# Patient Record
Sex: MALE | Race: WHITE | NOT HISPANIC OR LATINO
[De-identification: names, ages, dates, MRNs, and addresses within clinical notes are randomized per-mention and may not be internally consistent; named-entity substitution may affect disease eponyms.]

---

## 2021-05-26 PROBLEM — Z00.00 ENCOUNTER FOR PREVENTIVE HEALTH EXAMINATION: Status: ACTIVE | Noted: 2021-05-26

## 2021-06-28 ENCOUNTER — APPOINTMENT (OUTPATIENT)
Dept: ORTHOPEDIC SURGERY | Facility: CLINIC | Age: 76
End: 2021-06-28
Payer: COMMERCIAL

## 2021-06-28 DIAGNOSIS — M25.562 PAIN IN LEFT KNEE: ICD-10-CM

## 2021-06-28 DIAGNOSIS — Z78.9 OTHER SPECIFIED HEALTH STATUS: ICD-10-CM

## 2021-06-28 DIAGNOSIS — Z86.79 PERSONAL HISTORY OF OTHER DISEASES OF THE CIRCULATORY SYSTEM: ICD-10-CM

## 2021-06-28 PROCEDURE — 99072 ADDL SUPL MATRL&STAF TM PHE: CPT

## 2021-06-28 PROCEDURE — 73562 X-RAY EXAM OF KNEE 3: CPT | Mod: LT

## 2021-06-28 PROCEDURE — 99203 OFFICE O/P NEW LOW 30 MIN: CPT

## 2021-06-28 RX ORDER — LEVOTHYROXINE SODIUM 25 UG/1
25 TABLET ORAL
Qty: 90 | Refills: 0 | Status: DISCONTINUED | COMMUNITY
Start: 2021-04-07

## 2021-06-28 RX ORDER — TAMSULOSIN HYDROCHLORIDE 0.4 MG/1
0.4 CAPSULE ORAL
Qty: 90 | Refills: 0 | Status: DISCONTINUED | COMMUNITY
Start: 2021-06-24

## 2021-06-29 NOTE — ASSESSMENT
[FreeTextEntry1] : 75 year old male presents to the office with complaints of left knee pain. The pain has gotten progressively worse over the years. He is currently only walking 2-3 blocks. He has had no recent Tx for the knee. He has an arthroscopy in 1995. He comes here for eval and Tx. I did explain to him he has advanced OA of the knee. While he would benefit from an elective knee replacement, he may want to try conservative management first. He agreed and opted to try the gel injections.We will put in for approval and see him in the next few weeks. \par \par **ORDER GEL INJECTIONS LEFT KNEE**

## 2021-06-29 NOTE — PHYSICAL EXAM
[de-identified] : General appearance: well nourished and hydrated, pleasant, alert and oriented x 3, cooperative.\par Cardiovascular: no apparent abnormalities, no lower leg edema, no varicosities, pedal pulses are palpable.\par Neurologic: sensation is normal, no muscle weakness in upper or lower extremities\par Dermatologic no apparent skin lesions, moist, warm, no rash.\par Gait: nonantalgic.\par \par Left knee\par Inspection: no effusion or erythema.\par Wounds: none.\par Alignment: normal.\par Palpation: medial joint line tenderness \par ROM: 0-100 DEGREES\par Ligamentous laxity: all ligaments appear stable\par Muscle Test: good quad strength.\par \par Right knee\par Inspection: no effusion or erythema.\par Wounds: none.\par Alignment: normal.\par Palpation: no specific tenderness on palpation.\par ROM: 0-120 DEGREES \par Ligamentous laxity: all ligaments appear stable\par Muscle Test: good quad strength.\par \par Left hip\par Inspection: No swelling or ecchymosis.\par Wounds: none.\par Palpation: non-tender.\par Stability: no instability.\par Strength: 5/5 all motor groups.\par ROM: no pain with FROM.\par Leg length: equal.\par \par Right hip\par Inspection: No swelling or ecchymosis.\par Wounds: none.\par Palpation: non-tender.\par Stability: no instability.\par Strength: 5/5 all motor groups.\par ROM: no pain with FROM.\par Leg length: equal.\par  [de-identified] : Radiographs done today AP lateral and skyline of the left knee shows significantly narrowed but maintained medial joint space on the left, moderate on the right

## 2021-06-29 NOTE — HISTORY OF PRESENT ILLNESS
[de-identified] : 75 year old male presents to the office today complaining of left knee pain that has worsened in the last 2 years. PAtient reports pain that is achy in nature along with a sensation  of stiffness. There is buckling, clicking, and a loss of motion. PAtient reports only being able to ambulate about 2-3 blocks before pain stops her. There is pain and difficulty with negotiating stairs. PAtient also reports pain with prolonged standing. He reports taking Ibuprofen occasionally with good relief. PAtient states he had an arthroscopy of the left knee in 1995. PAtient is here today to discuss his next options for pain relief.

## 2021-07-21 ENCOUNTER — EMERGENCY (EMERGENCY)
Facility: HOSPITAL | Age: 76
LOS: 0 days | Discharge: HOME | End: 2021-07-21
Attending: EMERGENCY MEDICINE | Admitting: EMERGENCY MEDICINE
Payer: COMMERCIAL

## 2021-07-21 VITALS
TEMPERATURE: 98 F | RESPIRATION RATE: 18 BRPM | HEART RATE: 71 BPM | OXYGEN SATURATION: 95 % | SYSTOLIC BLOOD PRESSURE: 175 MMHG | WEIGHT: 173.94 LBS | DIASTOLIC BLOOD PRESSURE: 82 MMHG

## 2021-07-21 DIAGNOSIS — R10.9 UNSPECIFIED ABDOMINAL PAIN: ICD-10-CM

## 2021-07-21 DIAGNOSIS — R14.3 FLATULENCE: ICD-10-CM

## 2021-07-21 DIAGNOSIS — E03.9 HYPOTHYROIDISM, UNSPECIFIED: ICD-10-CM

## 2021-07-21 DIAGNOSIS — K59.00 CONSTIPATION, UNSPECIFIED: ICD-10-CM

## 2021-07-21 DIAGNOSIS — Z87.438 PERSONAL HISTORY OF OTHER DISEASES OF MALE GENITAL ORGANS: ICD-10-CM

## 2021-07-21 DIAGNOSIS — Z87.891 PERSONAL HISTORY OF NICOTINE DEPENDENCE: ICD-10-CM

## 2021-07-21 DIAGNOSIS — Z95.1 PRESENCE OF AORTOCORONARY BYPASS GRAFT: ICD-10-CM

## 2021-07-21 DIAGNOSIS — I25.10 ATHEROSCLEROTIC HEART DISEASE OF NATIVE CORONARY ARTERY WITHOUT ANGINA PECTORIS: ICD-10-CM

## 2021-07-21 DIAGNOSIS — E78.00 PURE HYPERCHOLESTEROLEMIA, UNSPECIFIED: ICD-10-CM

## 2021-07-21 PROCEDURE — 99284 EMERGENCY DEPT VISIT MOD MDM: CPT

## 2021-07-21 PROCEDURE — 76000 FLUOROSCOPY <1 HR PHYS/QHP: CPT | Mod: 26

## 2021-07-21 RX ORDER — MULTIVIT WITH MIN/MFOLATE/K2 340-15/3 G
1 POWDER (GRAM) ORAL ONCE
Refills: 0 | Status: DISCONTINUED | OUTPATIENT
Start: 2021-07-21 | End: 2021-07-21

## 2021-07-21 NOTE — ED PROVIDER NOTE - NS ED ROS FT
CONST: No fever, chills or bodyaches  CARD: No chest pain, palpitations  RESP: No SOB, cough, hemoptysis.   MS: No joint pain, back pain or extremity pain/injury  SKIN: No rashes  NEURO: No headache, dizziness, paresthesias or LOC

## 2021-07-21 NOTE — ED PROVIDER NOTE - OBJECTIVE STATEMENT
Pt with hx of CAD presents with 6 days of constipation that began after eating a whole container of cheese. Pt had left sided abd pain that resolved as of yesterday. Taking dulcolax with only liquid stools and some small formed pieces but not what he says is his normal. No longer with pain. Denies fever, NV. Admits to lots of flatus. Denies rectal pain

## 2021-07-21 NOTE — ED PROVIDER NOTE - PHYSICAL EXAMINATION
CONST: Well appearing in NAD  NECK: Non-tender, no meningeal signs  CARD: Normal S1 S2; Normal rate and rhythm  RESP: Equal BS B/L, No wheezes, rhonchi or rales. No distress  GI: Soft, non-tender, non-distended.  MS: Normal ROM in all extremities. No midline spinal tenderness.  SKIN: Warm, dry, no acute rashes. Good turgor  NEURO: A&Ox3, No focal deficits. Strength 5/5 with no sensory deficits. Steady gait  rectal: No stool in vault

## 2021-07-21 NOTE — ED PROVIDER NOTE - CARE PROVIDER_API CALL
Mike Barragan)  Gastroenterology; Internal Medicine  [address] [place] [address]  Phone: [phone]  Fax: [phone]  Follow Up Time: 4-6 Days

## 2021-07-21 NOTE — ED PROVIDER NOTE - CLINICAL SUMMARY MEDICAL DECISION MAKING FREE TEXT BOX
76 yo male PMH CAD s/o CABG years ago, HTN, elevated cholesterol BPH, hypothyroidism c/o constipation for 4 days.  Patient reports eating a large container of hard cheese on Friday, that was also the day of his last BM.,  In the past cheese ingestion has given him constipation .  Reports passing small amt of hard and liquidy stools 2 days ago, he also had transient abdominal pain a few days ago which has resolved since.  Denies any recent illness, unintentional weight loss, fever, chills flank or back pain, no urinary complaints , no black or bloody stools or any other concerning symptoms.   Well-appearing well-nourished elderly male in  NAD, head AT/NC, PERRL, pink conjunctivae,  mmm, nml oropharynx, nml phonation without drooling or trismus, supple neck without midline spine ttp, nml work of breathing, lungs CTA b/l, equal air entry, RRR, well-perfused extremities, distal pulses intact, abdomen soft, NT/ND, BS present in all quadrants, no midline spine or CVA ttp, no leg edema or unilateral calf swelling, A&Ox3, no focal neuro deficits, nml mood and affect, KONSTANTIN performed by MARYAM Ledezma and reported as negative for blood /masses or stool.  Abdominal x-ray shows non-obstructive bowel pattern, patient was prescribed Mag citrate , stable for d/c home, strict return precautions given.

## 2021-07-21 NOTE — ED PROVIDER NOTE - PATIENT PORTAL LINK FT
You can access the FollowMyHealth Patient Portal offered by [facility] by registering at the following website: [email or web address]. By joining [facility]’s FollowMyHealth portal, you will also be able to view your health information using other applications (apps) compatible with our system.

## 2021-12-14 PROBLEM — I10 ESSENTIAL (PRIMARY) HYPERTENSION: Chronic | Status: ACTIVE | Noted: 2021-07-21

## 2021-12-14 PROBLEM — E78.00 PURE HYPERCHOLESTEROLEMIA, UNSPECIFIED: Chronic | Status: ACTIVE | Noted: 2021-07-21

## 2022-03-07 ENCOUNTER — APPOINTMENT (OUTPATIENT)
Dept: UROLOGY | Facility: CLINIC | Age: 77
End: 2022-03-07
Payer: COMMERCIAL

## 2022-03-07 VITALS — WEIGHT: 170 LBS | BODY MASS INDEX: 25.76 KG/M2 | HEIGHT: 68 IN

## 2022-03-07 DIAGNOSIS — E07.9 DISORDER OF THYROID, UNSPECIFIED: ICD-10-CM

## 2022-03-07 PROCEDURE — 99204 OFFICE O/P NEW MOD 45 MIN: CPT

## 2022-03-07 NOTE — ASSESSMENT
[FreeTextEntry1] : 76 year old male presents for evaluation for Frequent Urination and Erectile Dysfunction. \par Currently on Tamsulosin for Urinary symptoms with mild improvement. \par \par Will obtain Kidney Bladder Sonogram\par UA and U culture. \par \par Tadalafil for erections. Side effects review. Emergency Precautions reviewed. \par \par Plan\par -Follow up 2 months to review above. \par

## 2022-03-07 NOTE — HISTORY OF PRESENT ILLNESS
[FreeTextEntry1] : STEVE BONILLA is a 76 year year old presenting for a General Urology Check Up. \par Patient has a past medical history of hypertension, and heart attack in 1993. High Cholesterol. \par \par Urination symptoms: Patient reports weak urinary stream. He states that he feels he urinates frequently. Patient reports nocturia 4 x nightly. Patient states that he has started to have urgency symptoms last month. Denies dysuria and gross hematuria. \par Patient states that his urinary symptoms when he has constipation. \par Patient was started on Tamsulosin which has helped with his urinary symptoms. \par \par Erections: Patient reports that he has difficulty getting or maintaining an erection. Patient is interested in treatment. Patient states he has tried medication in the past Levitra and he states that he got light headed when he took the medication last weekend. \par Patient denies chest pain with exercise. Denies taking nitroglycerine medication. \par \par Prostate Cancer Screening: PSA in 2015 was 1.71 ng/mL \par \par Liquid Intake: 2 cups of coffee daily. Patient states he recently stopped drinking coffee and now only drinks 1 cups of green tea. \par Occupation: . \par \par Primary Care Doctor: Dr. Dobbins. \par

## 2022-03-15 ENCOUNTER — NON-APPOINTMENT (OUTPATIENT)
Age: 77
End: 2022-03-15

## 2022-03-15 RX ORDER — TADALAFIL 20 MG/1
20 TABLET ORAL
Qty: 18 | Refills: 3 | Status: DISCONTINUED | COMMUNITY
Start: 2022-03-07 | End: 2022-03-15

## 2022-03-16 ENCOUNTER — NON-APPOINTMENT (OUTPATIENT)
Age: 77
End: 2022-03-16

## 2022-05-02 ENCOUNTER — LABORATORY RESULT (OUTPATIENT)
Age: 77
End: 2022-05-02

## 2022-05-02 ENCOUNTER — APPOINTMENT (OUTPATIENT)
Dept: UROLOGY | Facility: CLINIC | Age: 77
End: 2022-05-02
Payer: COMMERCIAL

## 2022-05-02 VITALS — BODY MASS INDEX: 25.76 KG/M2 | WEIGHT: 170 LBS | HEIGHT: 68 IN

## 2022-05-02 DIAGNOSIS — N28.1 CYST OF KIDNEY, ACQUIRED: ICD-10-CM

## 2022-05-02 PROCEDURE — 99214 OFFICE O/P EST MOD 30 MIN: CPT

## 2022-05-02 NOTE — HISTORY OF PRESENT ILLNESS
[FreeTextEntry1] : 76 year old male presents for evaluation for Frequent Urination and Erectile Dysfunction. \par Currently on Tamsulosin for Urinary symptoms with mild improvement. \par \par Tadalafil started for ED which he says give better erections\par \par labs not done\par Culture - Urine;\par Urinalysis; Status:\par \par April 2022\par US Kidney and Bladder- normal right kidney, small left renal stone without obstruction 5mm.  partially septated left renal cyst -- rec follow up imaging.  bph with mild bladder wall thickening -- prostate 51g.  postvoid 9ml

## 2022-05-04 ENCOUNTER — APPOINTMENT (OUTPATIENT)
Dept: CARDIOLOGY | Facility: CLINIC | Age: 77
End: 2022-05-04
Payer: COMMERCIAL

## 2022-05-04 VITALS
BODY MASS INDEX: 27.43 KG/M2 | SYSTOLIC BLOOD PRESSURE: 130 MMHG | HEART RATE: 65 BPM | DIASTOLIC BLOOD PRESSURE: 76 MMHG | HEIGHT: 68 IN | WEIGHT: 181 LBS

## 2022-05-04 DIAGNOSIS — E03.9 HYPOTHYROIDISM, UNSPECIFIED: ICD-10-CM

## 2022-05-04 DIAGNOSIS — Z01.810 ENCOUNTER FOR PREPROCEDURAL CARDIOVASCULAR EXAMINATION: ICD-10-CM

## 2022-05-04 DIAGNOSIS — E78.5 HYPERLIPIDEMIA, UNSPECIFIED: ICD-10-CM

## 2022-05-04 DIAGNOSIS — Z13.6 ENCOUNTER FOR SCREENING FOR CARDIOVASCULAR DISORDERS: ICD-10-CM

## 2022-05-04 PROCEDURE — 99214 OFFICE O/P EST MOD 30 MIN: CPT

## 2022-05-04 PROCEDURE — 93000 ELECTROCARDIOGRAM COMPLETE: CPT

## 2022-05-04 PROCEDURE — 99204 OFFICE O/P NEW MOD 45 MIN: CPT

## 2022-05-04 RX ORDER — FINASTERIDE 5 MG/1
5 TABLET, FILM COATED ORAL
Qty: 90 | Refills: 3 | Status: DISCONTINUED | COMMUNITY
Start: 2022-05-02 | End: 2022-05-04

## 2022-05-04 NOTE — HISTORY OF PRESENT ILLNESS
[FreeTextEntry1] : Previously seen at [facility].\par \par 75 yo M with a history of CAD s/p 1v CABG, (4/1982), MI in 8/1993 (no intervention), HTN presents for risk stratification prior to L TKR with Dr. Womack.  Occasional "heartburn" sometimes after meals.  Denies dyspnea, palpitations, dizziness, presyncope or syncope.  Limited functional capacity due to arthritis\par \par \par EKG (5/4/2022):  NSR, IWMI, no ST-T changes\par

## 2022-05-04 NOTE — PHYSICAL EXAM
[Well Developed] : well developed [No Acute Distress] : no acute distress [Normal Conjunctiva] : normal conjunctiva [No Carotid Bruit] : no carotid bruit [Normal S1, S2] : normal S1, S2 [No Murmur] : no murmur [No Rub] : no rub [Clear Lung Fields] : clear lung fields [Good Air Entry] : good air entry [No Respiratory Distress] : no respiratory distress  [Soft] : abdomen soft [Non Tender] : non-tender [No Masses/organomegaly] : no masses/organomegaly [Normal Gait] : normal gait [No Edema] : no edema [No Cyanosis] : no cyanosis [No Rash] : no rash [No Skin Lesions] : no skin lesions [Moves all extremities] : moves all extremities [Alert and Oriented] : alert and oriented

## 2022-05-04 NOTE — REVIEW OF SYSTEMS
[Negative] : Heme/Lymph [FreeTextEntry5] : See HPI [FreeTextEntry6] : See HPI [FreeTextEntry9] : +arthritis

## 2022-05-09 LAB
APPEARANCE: CLEAR
BACTERIA UR CULT: NORMAL
BILIRUBIN URINE: NEGATIVE
BLOOD URINE: NEGATIVE
COLOR: ABNORMAL
GLUCOSE QUALITATIVE U: NEGATIVE
KETONES URINE: NEGATIVE
LEUKOCYTE ESTERASE URINE: NEGATIVE
NITRITE URINE: NEGATIVE
PH URINE: 6.5
PROTEIN URINE: NORMAL
SPECIFIC GRAVITY URINE: 1.02
UROBILINOGEN URINE: NORMAL

## 2022-05-18 ENCOUNTER — APPOINTMENT (OUTPATIENT)
Dept: ORTHOPEDIC SURGERY | Facility: CLINIC | Age: 77
End: 2022-05-18
Payer: COMMERCIAL

## 2022-05-18 VITALS — TEMPERATURE: 98 F

## 2022-05-18 PROCEDURE — 99213 OFFICE O/P EST LOW 20 MIN: CPT | Mod: 57

## 2022-05-19 NOTE — ASSESSMENT
[FreeTextEntry1] : 6/28/2021- 75 year old male presents to the office with complaints of left knee pain. The pain has gotten progressively worse over the years. He is currently only walking 2-3 blocks. He has had no recent Tx for the knee. He has an arthroscopy in 1995. He comes here for eval and Tx. I did explain to him he has advanced OA of the knee. While he would benefit from an elective knee replacement, he may want to try conservative management first. He agreed and opted to try the gel injections.We will put in for approval and see him in the next few weeks. \par \par 5/18/2022- Pt presents for a follow up of left knee pain. We ordered gel injections and they were not covered. He would like to proceed with an elective knee replacement surgery. We will schedule him for September. He will return in a few months for a pre op discussion. \par \par

## 2022-05-19 NOTE — PHYSICAL EXAM
[de-identified] : \par Left knee\par Inspection: no effusion or erythema.\par Wounds: none.\par Alignment: normal.\par Palpation: medial joint line tenderness \par ROM: 0-100 DEGREES\par Ligamentous laxity: all ligaments appear stable\par Muscle Test: good quad strength. [de-identified] : 6/28/2021- Radiographs done today AP lateral and skyline of the left knee shows significantly narrowed but maintained medial joint space on the left, moderate on the right

## 2022-05-23 ENCOUNTER — OUTPATIENT (OUTPATIENT)
Dept: OUTPATIENT SERVICES | Facility: HOSPITAL | Age: 77
LOS: 1 days | Discharge: HOME | End: 2022-05-23
Payer: COMMERCIAL

## 2022-05-23 DIAGNOSIS — I25.10 ATHEROSCLEROTIC HEART DISEASE OF NATIVE CORONARY ARTERY WITHOUT ANGINA PECTORIS: ICD-10-CM

## 2022-05-23 PROCEDURE — 78452 HT MUSCLE IMAGE SPECT MULT: CPT | Mod: 26,MH

## 2022-06-13 ENCOUNTER — APPOINTMENT (OUTPATIENT)
Dept: CARDIOLOGY | Facility: CLINIC | Age: 77
End: 2022-06-13
Payer: COMMERCIAL

## 2022-06-13 PROCEDURE — 93306 TTE W/DOPPLER COMPLETE: CPT

## 2022-07-12 ENCOUNTER — APPOINTMENT (OUTPATIENT)
Dept: CARDIOLOGY | Facility: CLINIC | Age: 77
End: 2022-07-12

## 2022-07-27 ENCOUNTER — APPOINTMENT (OUTPATIENT)
Dept: ORTHOPEDIC SURGERY | Facility: CLINIC | Age: 77
End: 2022-07-27

## 2022-07-27 DIAGNOSIS — M17.12 UNILATERAL PRIMARY OSTEOARTHRITIS, LEFT KNEE: ICD-10-CM

## 2022-07-27 PROCEDURE — 99214 OFFICE O/P EST MOD 30 MIN: CPT

## 2022-07-27 RX ORDER — TADALAFIL 20 MG/1
20 TABLET ORAL
Qty: 10 | Refills: 3 | Status: DISCONTINUED | COMMUNITY
Start: 2022-03-15 | End: 2022-07-27

## 2022-07-27 NOTE — PHYSICAL EXAM
[2+] : left 2+ [de-identified] : Left knee\par Inspection: no effusion or erythema.\par Wounds: none.\par Alignment: normal.\par Palpation: medial joint line tenderness \par ROM: 5-100 DEGREES\par Ligamentous laxity: all ligaments appear stable\par Muscle Test: good quad strength. [de-identified] : 6/28/2021- Radiographs done today AP lateral and skyline of the left knee shows significantly narrowed but maintained medial joint space on the left, moderate on the right

## 2022-07-27 NOTE — ASSESSMENT
[FreeTextEntry1] : 6/28/2021- 75 year old male presents to the office with complaints of left knee pain. The pain has gotten progressively worse over the years. He is currently only walking 2-3 blocks. He has had no recent Tx for the knee. He has an arthroscopy in 1995. He comes here for eval and Tx. I did explain to him he has advanced OA of the knee. While he would benefit from an elective knee replacement, he may want to try conservative management first. He agreed and opted to try the gel injections.We will put in for approval and see him in the next few weeks. \par \par 5/18/2022- Pt presents for a follow up of left knee pain. We ordered gel injections and they were not covered. He would like to proceed with an elective knee replacement surgery. We will schedule him for September. He will return in a few months for a pre op discussion. \par \par 7/27/2022- Pt presents for a pre op discussion. He is scheduled for a LTK on 9/8/2022. There have been no changes in his medical status or medications. He will see his PCP and cardiologist in preparation. We discussed the surgical procedure and hospital stay. I answered all his questions. He has 10 stairs to get into his house, so we discussed the possibility of getting an ambulance to take him home after surgery. \par \par The risks, benefits, alternatives of treatment, and aftercare precautions following joint replacement surgery were reviewed with the patient and all questions were answered. Models were used, radiographs reviewed and a brochure was given to the patient. The implant type utilized, including bearing surfaces fixation techniques were reviewed in detail, and the patient chose to proceed with elective joint replacement surgery. The patient's body mass index was recorded in my office notes, and for those patients whose  body mass index of greater than 30, I recommended that they review a weight reduction program with their internist. The importance of smoking cessation was reviewed with all patient's, and for those patients who still smoke, I recommended that they review a smoking cessation program with their internist. \par  Chest pain

## 2022-07-27 NOTE — HISTORY OF PRESENT ILLNESS
[de-identified] : 76 year old male presents to the office today for a pre op discussion. Patient is scheduled for a left total knee replacement on 9/8/2022. He denies any change in his medical history.

## 2022-08-16 ENCOUNTER — APPOINTMENT (OUTPATIENT)
Dept: CARDIOLOGY | Facility: CLINIC | Age: 77
End: 2022-08-16

## 2022-08-16 VITALS — HEART RATE: 74 BPM

## 2022-08-16 VITALS
BODY MASS INDEX: 26.37 KG/M2 | WEIGHT: 174 LBS | TEMPERATURE: 98 F | HEIGHT: 68 IN | SYSTOLIC BLOOD PRESSURE: 136 MMHG | DIASTOLIC BLOOD PRESSURE: 76 MMHG

## 2022-08-16 DIAGNOSIS — Z01.810 ENCOUNTER FOR PREPROCEDURAL CARDIOVASCULAR EXAMINATION: ICD-10-CM

## 2022-08-16 DIAGNOSIS — M17.12 UNILATERAL PRIMARY OSTEOARTHRITIS, LEFT KNEE: ICD-10-CM

## 2022-08-16 DIAGNOSIS — Z86.39 PERSONAL HISTORY OF OTHER ENDOCRINE, NUTRITIONAL AND METABOLIC DISEASE: ICD-10-CM

## 2022-08-16 PROCEDURE — 99214 OFFICE O/P EST MOD 30 MIN: CPT | Mod: 25

## 2022-08-16 PROCEDURE — 93000 ELECTROCARDIOGRAM COMPLETE: CPT

## 2022-08-16 NOTE — ASSESSMENT
[FreeTextEntry1] : 76 M with CAD s/p 1v CABG, (4/1982), MI in 8/1993 (no intervention) and hypertension.  Planned left TKR.  Lexiscan MPI showed mid-distal inferior infarction (scar) consistent with known prior MI and mild LV dysfunction LVEF 45-50%.  NO NEW evidence of stress-induced ischemia.  Mild valvular heart disease.  See scanned reports.\par \par \par * Patient-based characteristics (Functional capacity)\par Patient is able to achieve more than 4 MET (walk 4 blocks, climb 2 flights of stairs, etc...)          Y [X] / N []\par \par High-risk patient features:\par - Recent (<30 days) or active MI          Y [] / N [X]\par - Unstable or severe angina          Y [] / N [X]\par - Decompensated heart failure, or worsening or new-onset heart failure          Y [] / N [X]\par - Severe valvular disease          Y [] / N [X]\par - Significant arrhythmia (Tachy- or Bradyarrhythmia)          Y [] / N [X]\par \par * Surgery/Procedure-based characteristics (Type of surgery)\par - Elevated or Moderate-risk procedure (Inpatient)\par \par * Revised Cardiac Risk Index (RCRI)\par 1- History of ischemic heart disease          Y [X] / N []\par 2- History of congestive heart failure          Y [] / N [X]\par 3- History of stroke/TIA          Y [] / N [X]\par 4- History of insulin-dependent diabetes          Y [] / N [X]\par 5- Chronic kidney disease (Cr >2mg/dL)          Y [] / N [X]\par 6- Undergoing suprainguinal vascular, intraperitoneal, or intrathoracic surgery          Y [] / N [X]\par \par Class II risk (One factor) --> 6% risk (30-day risk of death, MI, or cardiac arrest)\par Moderate-risk for intermediate-risk surgery\par \par No further cardiac workup is indicated at this time.  There are no current cardiac contraindications that prohibit proceeding with the scheduled surgery/procedure.  This consult serves only as a perioperative cardiac risk stratification and evaluation to predict 30-day cardiac complications risk and mortality.  The decision to proceed with the surgery/procedure is made by the performing physician and the patient.\par

## 2022-08-17 ENCOUNTER — APPOINTMENT (OUTPATIENT)
Dept: UROLOGY | Facility: CLINIC | Age: 77
End: 2022-08-17

## 2022-08-17 VITALS
DIASTOLIC BLOOD PRESSURE: 74 MMHG | HEIGHT: 67 IN | WEIGHT: 174 LBS | HEART RATE: 74 BPM | BODY MASS INDEX: 27.31 KG/M2 | SYSTOLIC BLOOD PRESSURE: 147 MMHG

## 2022-08-17 PROCEDURE — 99213 OFFICE O/P EST LOW 20 MIN: CPT

## 2022-08-17 NOTE — ASSESSMENT
[FreeTextEntry1] : 76 year old male presents for evaluation for Frequent Urination and Erectile Dysfunction. \par Currently on Tamsulosin for Urinary symptoms with mild improvement. \par \par Tadalafil started for ED which he says give better erections\par \par \par April 2022\par US Kidney and Bladder- normal right kidney, small left renal stone without obstruction 5mm. partially septated left renal cyst -- rec follow up imaging. bph with mild bladder wall thickening -- prostate 51g. postvoid 9ml. \par \par Started: Finasteride 5 MG -- states that has noticed an improvement in his urination-- he misunderstood and stopped the flomax which he will restart \par \par May 2022\par Culture - Urine; negative\par Urinalysis; neg\par \par urinary flow can still be better -- will trial finasteride\par aware of possible side effects\par \par aware of min inv options for bph\par

## 2022-08-23 ENCOUNTER — RESULT REVIEW (OUTPATIENT)
Age: 77
End: 2022-08-23

## 2022-08-23 ENCOUNTER — OUTPATIENT (OUTPATIENT)
Dept: OUTPATIENT SERVICES | Facility: HOSPITAL | Age: 77
LOS: 1 days | Discharge: HOME | End: 2022-08-23

## 2022-08-23 VITALS
TEMPERATURE: 99 F | HEART RATE: 81 BPM | HEIGHT: 67 IN | RESPIRATION RATE: 15 BRPM | WEIGHT: 170.42 LBS | DIASTOLIC BLOOD PRESSURE: 69 MMHG | OXYGEN SATURATION: 98 % | SYSTOLIC BLOOD PRESSURE: 132 MMHG

## 2022-08-23 DIAGNOSIS — M17.12 UNILATERAL PRIMARY OSTEOARTHRITIS, LEFT KNEE: ICD-10-CM

## 2022-08-23 DIAGNOSIS — Z98.890 OTHER SPECIFIED POSTPROCEDURAL STATES: Chronic | ICD-10-CM

## 2022-08-23 DIAGNOSIS — I25.708 ATHEROSCLEROSIS OF CORONARY ARTERY BYPASS GRAFT(S), UNSPECIFIED, WITH OTHER FORMS OF ANGINA PECTORIS: Chronic | ICD-10-CM

## 2022-08-23 DIAGNOSIS — Z01.818 ENCOUNTER FOR OTHER PREPROCEDURAL EXAMINATION: ICD-10-CM

## 2022-08-23 LAB
A1C WITH ESTIMATED AVERAGE GLUCOSE RESULT: 5.5 % — SIGNIFICANT CHANGE UP (ref 4–5.6)
ALBUMIN SERPL ELPH-MCNC: 4.5 G/DL — SIGNIFICANT CHANGE UP (ref 3.5–5.2)
ALP SERPL-CCNC: 51 U/L — SIGNIFICANT CHANGE UP (ref 30–115)
ALT FLD-CCNC: 20 U/L — SIGNIFICANT CHANGE UP (ref 0–41)
ANION GAP SERPL CALC-SCNC: 13 MMOL/L — SIGNIFICANT CHANGE UP (ref 7–14)
APTT BLD: 28.1 SEC — SIGNIFICANT CHANGE UP (ref 27–39.2)
AST SERPL-CCNC: 31 U/L — SIGNIFICANT CHANGE UP (ref 0–41)
BASOPHILS # BLD AUTO: 0.02 K/UL — SIGNIFICANT CHANGE UP (ref 0–0.2)
BASOPHILS NFR BLD AUTO: 0.5 % — SIGNIFICANT CHANGE UP (ref 0–1)
BILIRUB SERPL-MCNC: 0.6 MG/DL — SIGNIFICANT CHANGE UP (ref 0.2–1.2)
BLD GP AB SCN SERPL QL: SIGNIFICANT CHANGE UP
BUN SERPL-MCNC: 23 MG/DL — HIGH (ref 10–20)
CALCIUM SERPL-MCNC: 9.5 MG/DL — SIGNIFICANT CHANGE UP (ref 8.5–10.1)
CHLORIDE SERPL-SCNC: 105 MMOL/L — SIGNIFICANT CHANGE UP (ref 98–110)
CO2 SERPL-SCNC: 25 MMOL/L — SIGNIFICANT CHANGE UP (ref 17–32)
CREAT SERPL-MCNC: 0.9 MG/DL — SIGNIFICANT CHANGE UP (ref 0.7–1.5)
EGFR: 90 ML/MIN/1.73M2 — SIGNIFICANT CHANGE UP
EOSINOPHIL # BLD AUTO: 0.06 K/UL — SIGNIFICANT CHANGE UP (ref 0–0.7)
EOSINOPHIL NFR BLD AUTO: 1.6 % — SIGNIFICANT CHANGE UP (ref 0–8)
ESTIMATED AVERAGE GLUCOSE: 111 MG/DL — SIGNIFICANT CHANGE UP (ref 68–114)
GLUCOSE SERPL-MCNC: 85 MG/DL — SIGNIFICANT CHANGE UP (ref 70–99)
HCT VFR BLD CALC: 40.2 % — LOW (ref 42–52)
HGB BLD-MCNC: 13.6 G/DL — LOW (ref 14–18)
IMM GRANULOCYTES NFR BLD AUTO: 0.3 % — SIGNIFICANT CHANGE UP (ref 0.1–0.3)
INR BLD: 1.04 RATIO — SIGNIFICANT CHANGE UP (ref 0.65–1.3)
LYMPHOCYTES # BLD AUTO: 1.17 K/UL — LOW (ref 1.2–3.4)
LYMPHOCYTES # BLD AUTO: 30.4 % — SIGNIFICANT CHANGE UP (ref 20.5–51.1)
MCHC RBC-ENTMCNC: 32.3 PG — HIGH (ref 27–31)
MCHC RBC-ENTMCNC: 33.8 G/DL — SIGNIFICANT CHANGE UP (ref 32–37)
MCV RBC AUTO: 95.5 FL — HIGH (ref 80–94)
MONOCYTES # BLD AUTO: 0.41 K/UL — SIGNIFICANT CHANGE UP (ref 0.1–0.6)
MONOCYTES NFR BLD AUTO: 10.6 % — HIGH (ref 1.7–9.3)
MRSA PCR RESULT.: NEGATIVE — SIGNIFICANT CHANGE UP
NEUTROPHILS # BLD AUTO: 2.18 K/UL — SIGNIFICANT CHANGE UP (ref 1.4–6.5)
NEUTROPHILS NFR BLD AUTO: 56.6 % — SIGNIFICANT CHANGE UP (ref 42.2–75.2)
NRBC # BLD: 0 /100 WBCS — SIGNIFICANT CHANGE UP (ref 0–0)
PLATELET # BLD AUTO: 145 K/UL — SIGNIFICANT CHANGE UP (ref 130–400)
POTASSIUM SERPL-MCNC: 4 MMOL/L — SIGNIFICANT CHANGE UP (ref 3.5–5)
POTASSIUM SERPL-SCNC: 4 MMOL/L — SIGNIFICANT CHANGE UP (ref 3.5–5)
PROT SERPL-MCNC: 6.2 G/DL — SIGNIFICANT CHANGE UP (ref 6–8)
PROTHROM AB SERPL-ACNC: 12 SEC — SIGNIFICANT CHANGE UP (ref 9.95–12.87)
RBC # BLD: 4.21 M/UL — LOW (ref 4.7–6.1)
RBC # FLD: 13 % — SIGNIFICANT CHANGE UP (ref 11.5–14.5)
SODIUM SERPL-SCNC: 143 MMOL/L — SIGNIFICANT CHANGE UP (ref 135–146)
WBC # BLD: 3.85 K/UL — LOW (ref 4.8–10.8)
WBC # FLD AUTO: 3.85 K/UL — LOW (ref 4.8–10.8)

## 2022-08-23 PROCEDURE — 72170 X-RAY EXAM OF PELVIS: CPT | Mod: 26

## 2022-08-23 PROCEDURE — 93010 ELECTROCARDIOGRAM REPORT: CPT

## 2022-08-23 PROCEDURE — 73562 X-RAY EXAM OF KNEE 3: CPT | Mod: 26,LT

## 2022-08-23 PROCEDURE — 71046 X-RAY EXAM CHEST 2 VIEWS: CPT | Mod: 26

## 2022-08-23 NOTE — H&P PST ADULT - REASON FOR ADMISSION
Patient is a    76  year old   male presenting to PAST in preparation for    left total knee replacement     on   9/8/22    under  regional anesthesia by Dr. Womack.  PT POINTS TO HIS LEFT KNEE THIS IS THE KNEE I AM HAVING SURGERY ON.   I HAVE HAD PAIN IN MY LEFT KNEE FOR A FEW YEARS.   THE LAST FEW MONTHS THE PAIN IN MY LEFT KNEE HAS INCREASED.  THE PAIN IS A 10/10.  THE PAIN IS AN  ACHING, THROBBING AND PRESSURE TYPE OF PAIN.   REST HELPS RELIEVE SOME PAIN.

## 2022-08-23 NOTE — H&P PST ADULT - HISTORY OF PRESENT ILLNESS
PT PRESENTS TO PAST WITH NO SOB, CP, PALPITATIONS, DYSURIA, UTI OR URI AT PRESENT.   PT ABLE TO WALK UP 2-3 FLIGHTS OF STEPS WITH NO SOB.  AS PER THE PT, THIS IS HIS/HER COMPLETE MEDICAL AND SURGICAL HX, INCLUDING MEDICATIONS PRESCRIBED AND OVER THE COUNTER  pt denies any covid s/s,     YES  --7/20/22   tested positive in the past--PT AWARE OF DATE AND TIME OF COVID TESTING PRIOR TO SURGERY.   pt advised self quarantine till day of procedure  denies travel outside the [place] in the past 30 days  Anesthesia Alert  NO--Difficult Airway  NO--History of neck surgery or radiation  NO--Limited ROM of neck  NO--History of Malignant hyperthermia  NO--Personal or family history of Pseudocholinesterase deficiency  NO--Prior Anesthesia Complication  NO--Latex Allergy  NO--Loose teeth  NO--History of Rheumatoid Arthritis  NO--IBETH  NO BLEEDING RISK  NO--Other_____

## 2022-08-23 NOTE — H&P PST ADULT - NSICDXPASTMEDICALHX_GEN_ALL_CORE_FT
PAST MEDICAL HISTORY:  BPH (benign prostatic hyperplasia)     Essential hypertension     High cholesterol     Hypothyroid as per pt- my thyroid levels where drawn 2 months ago- my medication doseage remained the same.    OA (osteoarthritis)      PAST MEDICAL HISTORY:  BPH (benign prostatic hyperplasia)     Essential hypertension     High cholesterol     Hypothyroid as per pt- my thyroid levels where drawn 2 months ago- my medication doseage remained the same.    Myocardial infarction 1993    OA (osteoarthritis)      PAST MEDICAL HISTORY:  BPH (benign prostatic hyperplasia)     Essential hypertension     Former smoker     High cholesterol     Hypothyroid as per pt- my thyroid levels where drawn 2 months ago- my medication doseage remained the same.    Myocardial infarction 1993    OA (osteoarthritis)

## 2022-08-23 NOTE — H&P PST ADULT - NSICDXPASTSURGICALHX_GEN_ALL_CORE_FT
PAST SURGICAL HISTORY:  Atherosclerosis of CABG w oth angina pectoris 1982   x1    H/O arthroscopy right knee/ left knee    H/O arthroscopy of shoulder right  with bone spur repair    H/O carpal tunnel repair b/l

## 2022-09-05 ENCOUNTER — LABORATORY RESULT (OUTPATIENT)
Age: 77
End: 2022-09-05

## 2022-09-08 ENCOUNTER — INPATIENT (INPATIENT)
Facility: HOSPITAL | Age: 77
LOS: 1 days | Discharge: HOME HEALTH | End: 2022-09-10
Attending: INTERNAL MEDICINE | Admitting: INTERNAL MEDICINE
Payer: COMMERCIAL

## 2022-09-08 ENCOUNTER — TRANSCRIPTION ENCOUNTER (OUTPATIENT)
Age: 77
End: 2022-09-08

## 2022-09-08 ENCOUNTER — RESULT REVIEW (OUTPATIENT)
Age: 77
End: 2022-09-08

## 2022-09-08 ENCOUNTER — APPOINTMENT (OUTPATIENT)
Dept: ORTHOPEDIC SURGERY | Facility: HOSPITAL | Age: 77
End: 2022-09-08

## 2022-09-08 VITALS
DIASTOLIC BLOOD PRESSURE: 82 MMHG | HEART RATE: 62 BPM | TEMPERATURE: 98 F | SYSTOLIC BLOOD PRESSURE: 185 MMHG | RESPIRATION RATE: 18 BRPM | OXYGEN SATURATION: 98 % | HEIGHT: 67 IN | WEIGHT: 169.09 LBS

## 2022-09-08 DIAGNOSIS — Z98.890 OTHER SPECIFIED POSTPROCEDURAL STATES: Chronic | ICD-10-CM

## 2022-09-08 DIAGNOSIS — N17.9 ACUTE KIDNEY FAILURE, UNSPECIFIED: ICD-10-CM

## 2022-09-08 DIAGNOSIS — E03.9 HYPOTHYROIDISM, UNSPECIFIED: ICD-10-CM

## 2022-09-08 DIAGNOSIS — I10 ESSENTIAL (PRIMARY) HYPERTENSION: ICD-10-CM

## 2022-09-08 DIAGNOSIS — M17.12 UNILATERAL PRIMARY OSTEOARTHRITIS, LEFT KNEE: ICD-10-CM

## 2022-09-08 DIAGNOSIS — I25.708 ATHEROSCLEROSIS OF CORONARY ARTERY BYPASS GRAFT(S), UNSPECIFIED, WITH OTHER FORMS OF ANGINA PECTORIS: Chronic | ICD-10-CM

## 2022-09-08 DIAGNOSIS — Z87.891 PERSONAL HISTORY OF NICOTINE DEPENDENCE: ICD-10-CM

## 2022-09-08 DIAGNOSIS — N40.0 BENIGN PROSTATIC HYPERPLASIA WITHOUT LOWER URINARY TRACT SYMPTOMS: ICD-10-CM

## 2022-09-08 DIAGNOSIS — E78.5 HYPERLIPIDEMIA, UNSPECIFIED: ICD-10-CM

## 2022-09-08 DIAGNOSIS — E86.0 DEHYDRATION: ICD-10-CM

## 2022-09-08 DIAGNOSIS — R33.9 RETENTION OF URINE, UNSPECIFIED: ICD-10-CM

## 2022-09-08 LAB
GLUCOSE BLDC GLUCOMTR-MCNC: 116 MG/DL — HIGH (ref 70–99)
GLUCOSE BLDC GLUCOMTR-MCNC: 122 MG/DL — HIGH (ref 70–99)

## 2022-09-08 PROCEDURE — 27447 TOTAL KNEE ARTHROPLASTY: CPT | Mod: LT

## 2022-09-08 PROCEDURE — 51702 INSERT TEMP BLADDER CATH: CPT

## 2022-09-08 PROCEDURE — 88305 TISSUE EXAM BY PATHOLOGIST: CPT | Mod: 26

## 2022-09-08 PROCEDURE — 88311 DECALCIFY TISSUE: CPT | Mod: 26

## 2022-09-08 RX ORDER — CELECOXIB 200 MG/1
200 CAPSULE ORAL ONCE
Refills: 0 | Status: COMPLETED | OUTPATIENT
Start: 2022-09-08 | End: 2022-09-08

## 2022-09-08 RX ORDER — ASPIRIN/CALCIUM CARB/MAGNESIUM 324 MG
81 TABLET ORAL EVERY 12 HOURS
Refills: 0 | Status: DISCONTINUED | OUTPATIENT
Start: 2022-09-08 | End: 2022-09-10

## 2022-09-08 RX ORDER — PANTOPRAZOLE SODIUM 20 MG/1
40 TABLET, DELAYED RELEASE ORAL
Refills: 0 | Status: DISCONTINUED | OUTPATIENT
Start: 2022-09-08 | End: 2022-09-10

## 2022-09-08 RX ORDER — DEXAMETHASONE 0.5 MG/5ML
2 ELIXIR ORAL ONCE
Refills: 0 | Status: COMPLETED | OUTPATIENT
Start: 2022-09-09 | End: 2022-09-09

## 2022-09-08 RX ORDER — ONDANSETRON 8 MG/1
4 TABLET, FILM COATED ORAL ONCE
Refills: 0 | Status: DISCONTINUED | OUTPATIENT
Start: 2022-09-08 | End: 2022-09-08

## 2022-09-08 RX ORDER — ACETAMINOPHEN 500 MG
650 TABLET ORAL EVERY 6 HOURS
Refills: 0 | Status: DISCONTINUED | OUTPATIENT
Start: 2022-09-08 | End: 2022-09-10

## 2022-09-08 RX ORDER — SODIUM CHLORIDE 9 MG/ML
1000 INJECTION INTRAMUSCULAR; INTRAVENOUS; SUBCUTANEOUS
Refills: 0 | Status: DISCONTINUED | OUTPATIENT
Start: 2022-09-08 | End: 2022-09-09

## 2022-09-08 RX ORDER — CHLORHEXIDINE GLUCONATE 213 G/1000ML
1 SOLUTION TOPICAL
Refills: 0 | Status: DISCONTINUED | OUTPATIENT
Start: 2022-09-08 | End: 2022-09-10

## 2022-09-08 RX ORDER — MEPERIDINE HYDROCHLORIDE 50 MG/ML
12.5 INJECTION INTRAMUSCULAR; INTRAVENOUS; SUBCUTANEOUS
Refills: 0 | Status: DISCONTINUED | OUTPATIENT
Start: 2022-09-08 | End: 2022-09-08

## 2022-09-08 RX ORDER — ONDANSETRON 8 MG/1
4 TABLET, FILM COATED ORAL EVERY 6 HOURS
Refills: 0 | Status: DISCONTINUED | OUTPATIENT
Start: 2022-09-08 | End: 2022-09-10

## 2022-09-08 RX ORDER — TRAMADOL HYDROCHLORIDE 50 MG/1
50 TABLET ORAL EVERY 4 HOURS
Refills: 0 | Status: DISCONTINUED | OUTPATIENT
Start: 2022-09-08 | End: 2022-09-10

## 2022-09-08 RX ORDER — POLYETHYLENE GLYCOL 3350 17 G/17G
17 POWDER, FOR SOLUTION ORAL AT BEDTIME
Refills: 0 | Status: DISCONTINUED | OUTPATIENT
Start: 2022-09-08 | End: 2022-09-10

## 2022-09-08 RX ORDER — SODIUM CHLORIDE 9 MG/ML
1000 INJECTION, SOLUTION INTRAVENOUS
Refills: 0 | Status: DISCONTINUED | OUTPATIENT
Start: 2022-09-08 | End: 2022-09-08

## 2022-09-08 RX ORDER — ACETAMINOPHEN 500 MG
1000 TABLET ORAL ONCE
Refills: 0 | Status: COMPLETED | OUTPATIENT
Start: 2022-09-08 | End: 2022-09-08

## 2022-09-08 RX ORDER — CEFAZOLIN SODIUM 1 G
2000 VIAL (EA) INJECTION EVERY 8 HOURS
Refills: 0 | Status: COMPLETED | OUTPATIENT
Start: 2022-09-08 | End: 2022-09-09

## 2022-09-08 RX ORDER — OXYCODONE HYDROCHLORIDE 5 MG/1
5 TABLET ORAL EVERY 4 HOURS
Refills: 0 | Status: DISCONTINUED | OUTPATIENT
Start: 2022-09-08 | End: 2022-09-10

## 2022-09-08 RX ORDER — MAGNESIUM HYDROXIDE 400 MG/1
30 TABLET, CHEWABLE ORAL DAILY
Refills: 0 | Status: DISCONTINUED | OUTPATIENT
Start: 2022-09-08 | End: 2022-09-10

## 2022-09-08 RX ORDER — HYDROMORPHONE HYDROCHLORIDE 2 MG/ML
1 INJECTION INTRAMUSCULAR; INTRAVENOUS; SUBCUTANEOUS
Refills: 0 | Status: DISCONTINUED | OUTPATIENT
Start: 2022-09-08 | End: 2022-09-08

## 2022-09-08 RX ORDER — FENOFIBRATE,MICRONIZED 130 MG
145 CAPSULE ORAL DAILY
Refills: 0 | Status: DISCONTINUED | OUTPATIENT
Start: 2022-09-09 | End: 2022-09-09

## 2022-09-08 RX ORDER — HYDROMORPHONE HYDROCHLORIDE 2 MG/ML
0.5 INJECTION INTRAMUSCULAR; INTRAVENOUS; SUBCUTANEOUS
Refills: 0 | Status: DISCONTINUED | OUTPATIENT
Start: 2022-09-08 | End: 2022-09-08

## 2022-09-08 RX ORDER — CELECOXIB 200 MG/1
200 CAPSULE ORAL EVERY 12 HOURS
Refills: 0 | Status: DISCONTINUED | OUTPATIENT
Start: 2022-09-09 | End: 2022-09-09

## 2022-09-08 RX ORDER — ATORVASTATIN CALCIUM 80 MG/1
20 TABLET, FILM COATED ORAL AT BEDTIME
Refills: 0 | Status: DISCONTINUED | OUTPATIENT
Start: 2022-09-08 | End: 2022-09-10

## 2022-09-08 RX ORDER — AMLODIPINE BESYLATE 2.5 MG/1
5 TABLET ORAL DAILY
Refills: 0 | Status: DISCONTINUED | OUTPATIENT
Start: 2022-09-09 | End: 2022-09-10

## 2022-09-08 RX ORDER — SENNA PLUS 8.6 MG/1
2 TABLET ORAL AT BEDTIME
Refills: 0 | Status: DISCONTINUED | OUTPATIENT
Start: 2022-09-08 | End: 2022-09-10

## 2022-09-08 RX ORDER — KETOROLAC TROMETHAMINE 30 MG/ML
15 SYRINGE (ML) INJECTION EVERY 6 HOURS
Refills: 0 | Status: DISCONTINUED | OUTPATIENT
Start: 2022-09-08 | End: 2022-09-09

## 2022-09-08 RX ORDER — TAMSULOSIN HYDROCHLORIDE 0.4 MG/1
0.4 CAPSULE ORAL AT BEDTIME
Refills: 0 | Status: DISCONTINUED | OUTPATIENT
Start: 2022-09-08 | End: 2022-09-10

## 2022-09-08 RX ORDER — FINASTERIDE 5 MG/1
5 TABLET, FILM COATED ORAL DAILY
Refills: 0 | Status: DISCONTINUED | OUTPATIENT
Start: 2022-09-08 | End: 2022-09-10

## 2022-09-08 RX ORDER — LEVOTHYROXINE SODIUM 125 MCG
25 TABLET ORAL DAILY
Refills: 0 | Status: DISCONTINUED | OUTPATIENT
Start: 2022-09-08 | End: 2022-09-10

## 2022-09-08 RX ORDER — ATENOLOL 25 MG/1
100 TABLET ORAL DAILY
Refills: 0 | Status: DISCONTINUED | OUTPATIENT
Start: 2022-09-08 | End: 2022-09-10

## 2022-09-08 RX ADMIN — ATORVASTATIN CALCIUM 20 MILLIGRAM(S): 80 TABLET, FILM COATED ORAL at 21:59

## 2022-09-08 RX ADMIN — Medication 650 MILLIGRAM(S): at 18:22

## 2022-09-08 RX ADMIN — CELECOXIB 200 MILLIGRAM(S): 200 CAPSULE ORAL at 09:26

## 2022-09-08 RX ADMIN — Medication 650 MILLIGRAM(S): at 19:00

## 2022-09-08 RX ADMIN — SODIUM CHLORIDE 75 MILLILITER(S): 9 INJECTION INTRAMUSCULAR; INTRAVENOUS; SUBCUTANEOUS at 18:21

## 2022-09-08 RX ADMIN — Medication 81 MILLIGRAM(S): at 21:58

## 2022-09-08 RX ADMIN — POLYETHYLENE GLYCOL 3350 17 GRAM(S): 17 POWDER, FOR SOLUTION ORAL at 21:59

## 2022-09-08 RX ADMIN — Medication 20 MILLIGRAM(S): at 21:59

## 2022-09-08 RX ADMIN — Medication 1000 MILLIGRAM(S): at 09:26

## 2022-09-08 RX ADMIN — Medication 100 MILLIGRAM(S): at 21:58

## 2022-09-08 RX ADMIN — TAMSULOSIN HYDROCHLORIDE 0.4 MILLIGRAM(S): 0.4 CAPSULE ORAL at 21:58

## 2022-09-08 RX ADMIN — SENNA PLUS 2 TABLET(S): 8.6 TABLET ORAL at 21:58

## 2022-09-08 NOTE — PHYSICAL THERAPY INITIAL EVALUATION ADULT - ADDITIONAL COMMENTS
pt is 77 y/o old , lives with Son  in  , information obtain from the pt him self  , has 14  steps to enter with Rt  HR and no steps inside thew house   , pt was independent  with bed mobility , transfer , ambulation ,stair negotiation and basic ADLS PTA

## 2022-09-08 NOTE — CHART NOTE - NSCHARTNOTEFT_GEN_A_CORE
pt with urinary retention on bladder scan > 400 cc   floor staff unable to place whitehead x 1 attempt   16 Fr coude catheter placed with immediate return of 300 cc clear yellow urine

## 2022-09-08 NOTE — DISCHARGE NOTE PROVIDER - HOSPITAL COURSE
On 9/8/2022 patient underwent a rleft total knee replacement.  Patient tolerated the procedure well with no complications. Antibiotic prophylaxis with ancef 24 hours, dvt prophylaxis with aspirin 81mg every 12 hours for 35 days post op.  Post op participated with physical therapy and did well.  Was cleared for discharge home with services weight bearing as tolerated.  follow up with dr gonzalez as a out patient On 9/8/2022 patient underwent a rleft total knee replacement.  Patient tolerated the procedure well with no complications. Antibiotic prophylaxis with ancef 24 hours, dvt prophylaxis with aspirin 81mg every 12 hours for 35 days post op.  Post op participated with physical therapy and did well.  Was cleared for discharge home with services weight bearing as tolerated.  follow up with dr montalvo as a out patient. Pt was kept in the hospital for an additional night 2/2 EMILY, which resolved with IVF.

## 2022-09-08 NOTE — ASU PATIENT PROFILE, ADULT - NSICDXPASTMEDICALHX_GEN_ALL_CORE_FT
PAST MEDICAL HISTORY:  BPH (benign prostatic hyperplasia)     Essential hypertension     Former smoker     High cholesterol     Hypothyroid as per pt- my thyroid levels where drawn 2 months ago- my medication doseage remained the same.    Myocardial infarction 1993    OA (osteoarthritis)

## 2022-09-08 NOTE — BRIEF OPERATIVE NOTE - ELECTIVE PROCEDURE
[employer] left message stating that patient is due for new Rx for Repatha. Asked to have Rx faxed to [phone] or called in to [phone].  Case number: [id number]
Yes

## 2022-09-08 NOTE — ASU PREOP CHECKLIST - ANTIBIOTIC
Chief Complaint   Patient presents with   • RUQ Pain     Started sat AM, intermittent sharp 9/10   • Back Pain     Below right shoulder blade        n/a

## 2022-09-08 NOTE — DISCHARGE NOTE PROVIDER - NSDCMRMEDTOKEN_GEN_ALL_CORE_FT
amLODIPine 5 mg oral tablet: 1 tab(s) orally once a day  atenolol 100 mg oral tablet: 1 tab(s) orally once a day  atorvastatin 20 mg oral tablet: 1 tab(s) orally once a day  enalapril 20 mg oral tablet: 1 tab(s) orally once a day  enalapril 20 mg oral tablet: 1 tab(s) orally 2 times a day  fenofibrate 145 mg oral tablet: 1 tab(s) orally once a day  finasteride 5 mg oral tablet: 1 tab(s) orally once a day  levothyroxine 25 mcg (0.025 mg) oral tablet: 1 tab(s) orally once a day  tamsulosin 0.4 mg oral capsule: 1 cap(s) orally once a day   acetaminophen 325 mg oral tablet: 2 tab(s) orally every 6 hours  amLODIPine 5 mg oral tablet: 1 tab(s) orally once a day  aspirin 81 mg oral delayed release tablet: 1 tab(s) orally every 12 hours  atenolol 100 mg oral tablet: 1 tab(s) orally once a day  atorvastatin 20 mg oral tablet: 1 tab(s) orally once a day  enalapril 20 mg oral tablet: 1 tab(s) orally 2 times a day  fenofibrate 145 mg oral tablet: 1 tab(s) orally once a day  finasteride 5 mg oral tablet: 1 tab(s) orally once a day  levothyroxine 25 mcg (0.025 mg) oral tablet: 1 tab(s) orally once a day  oxyCODONE 5 mg oral tablet: 1 tab(s) orally every 6 hours, As Needed -breakthrough pain.  take if tramadol first fails to control pain, can take 2 hours after last tramadol dose MDD:4  pantoprazole 40 mg oral delayed release tablet: 1 tab(s) orally once a day (before a meal)  senna leaf extract oral tablet: 2 tab(s) orally once a day (at bedtime)  tamsulosin 0.4 mg oral capsule: 1 cap(s) orally once a day  traMADol 50 mg oral tablet: 1 tab(s) orally every 4 hours, As needed, Mild Pain (1 - 3) MDD:6

## 2022-09-08 NOTE — PATIENT PROFILE ADULT - NSPROGENBLOODRESTRICT_GEN_A_NUR
Message left on Pt's VM informing him that his insurance no longer covers Candesartan. Per Dr. Contreras, Pt instructed to stop Candesartan and change to Irbesartan 150mg daily. Pt instructed to monitor BP q 3 days x 3 weeks and send in readings in 3 weeks. Script sent to [facility].    none

## 2022-09-08 NOTE — CHART NOTE - NSCHARTNOTEFT_GEN_A_CORE
PACU ANESTHESIA ADMISSION NOTE      Procedure: Left total knee arthroplasty      Post op diagnosis:  Arthritis of left knee        ____  Intubated  TV:______       Rate: ______      FiO2: ______    ___x_  Patent Airway    __x__  Full return of protective reflexes    __x__  Full recovery from anesthesia / back to baseline status    Vitals:  temp(F) 98  /72  spo2 98  RR 16  pulse 57    Mental Status:  __x __ Awake   _____ Alert   _____ Drowsy   _____ Sedated    Nausea/Vomiting:  __x __ NO  ______Yes,   See Post - Op Orders          Pain Scale (0-10):  _____    Treatment: ____ None    _x ___ See Post - Op/PCA Orders    Post - Operative Fluids:   ____ Oral   _x ___ See Post - Op Orders    Plan: Discharge:   ____Home       ___x __Floor     _____Critical Care    _____  Other:_________________    Comments: uneventful anesthesia course no complications. Vitals stable. Pt transferred to PACU

## 2022-09-08 NOTE — PATIENT PROFILE ADULT - FALL HARM RISK - HARM RISK INTERVENTIONS
Assistance with ambulation/Assistance OOB with selected safe patient handling equipment/Communicate Risk of Fall with Harm to all staff/Discuss with provider need for PT consult/Monitor gait and stability/Provide patient with walking aids - walker, cane, crutches/Reinforce activity limits and safety measures with patient and family/Sit up slowly, dangle for a short time, stand at bedside before walking/Tailored Fall Risk Interventions/Use of alarms - bed, chair and/or voice tab/Visual Cue: Yellow wristband and red socks/Bed in lowest position, wheels locked, appropriate side rails in place/Call bell, personal items and telephone in reach/Instruct patient to call for assistance before getting out of bed or chair/Non-slip footwear when patient is out of bed/[place] to call system/Physically safe environment - no spills, clutter or unnecessary equipment/Purposeful Proactive Rounding/Room/bathroom lighting operational, light cord in reach

## 2022-09-08 NOTE — PROCEDURE NOTE - ADDITIONAL PROCEDURE DETAILS
pt with >400cc urine in bladder on scan  RN unable to place whitehead     16fr coude catheter inserted without difficultly

## 2022-09-08 NOTE — DISCHARGE NOTE PROVIDER - ATTENDING DISCHARGE PHYSICAL EXAMINATION:
PHYSICAL EXAM:  GENERAL: NAD, lying in bed comfortably  HEAD:  Atraumatic, Normocephalic  EYES: EOMI, PERRLA, conjunctiva and sclera clear  ENT: Moist mucous membranes  NECK: Supple, No JVD  CHEST/LUNG: Clear to auscultation bilaterally; No rales, rhonchi, wheezing, or rubs. Unlabored respirations  HEART: Regular rate and rhythm; No murmurs, rubs, or gallops  ABDOMEN: Bowel sounds present; Soft, Nontender, Nondistended. No hepatomegally  EXTREMITIES:  2+ Peripheral Pulses, brisk capillary refill. No clubbing, cyanosis, or edema  NERVOUS SYSTEM:  Alert & Oriented X3, speech clear. No deficits   MSK: LLE dressing clean dry and intact   SKIN: No rashes or lesions

## 2022-09-08 NOTE — ASU PATIENT PROFILE, ADULT - FALL HARM RISK - HARM RISK INTERVENTIONS
Communicate Risk of Fall with Harm to all staff/Discuss with provider need for PT consult/Reinforce activity limits and safety measures with patient and family/Tailored Fall Risk Interventions/Visual Cue: Yellow wristband and red socks/Bed in lowest position, wheels locked, appropriate side rails in place/Call bell, personal items and telephone in reach/Instruct patient to call for assistance before getting out of bed or chair/Non-slip footwear when patient is out of bed/[place] to call system/Physically safe environment - no spills, clutter or unnecessary equipment/Purposeful Proactive Rounding/Room/bathroom lighting operational, light cord in reach

## 2022-09-08 NOTE — DISCHARGE NOTE PROVIDER - NSDCFUADDINST_GEN_ALL_CORE_FT
REMOVE AQUACELL DRESSING 1 WEEK AFTER SURGERY.  OK TO SHOWER, DO NOT SATURATE, PAT DRY.  ONCE REMOVED DO NOT APPLY ANY LOTIONS OR CREAMS TO INCISION.  iF INCREASED PAIN FEVER SWELLING OR DISCHARGE/BLEEDING CALL MD OFFICE.  STAPLE REMOVAL IN OFFICE 2 WEEKS POST OP.  CALL FOR APPOINTMENT.  PLEASE TAKE ASPIRIN 81MG EVERY 12 HOURS FOR THE NEXT 35 DAYS FOR BLOOD CLOT PREVENTION.  START TONIGHT  ICE TO KNEE PLACE OVER A TOWEL, 20 MINUTES ON 20 MINUTES OFF.  PAIN MEDS AS PRESCRIBED.  CAN TAKE OVER THE COUNTER TYLENOL.  PROTONIX FOR HEARTBURN PREVENTION.  SENNA STOOL SOFTENER AND NEEDED.  WEIGHT BEARING AS TOLERATED WITH A WALKER.  CALL OFFICE FOR APPOINTMENT

## 2022-09-08 NOTE — DISCHARGE NOTE PROVIDER - NSDCCPCAREPLAN_GEN_ALL_CORE_FT
PRINCIPAL DISCHARGE DIAGNOSIS  Diagnosis: S/P total knee arthroplasty, left  Assessment and Plan of Treatment: REMOVE AQUACELL DRESSING 1 WEEK AFTER SURGERY.  OK TO SHOWER, DO NOT SATURATE, PAT DRY.  ONCE REMOVED DO NOT APPLY ANY LOTIONS OR CREAMS TO INCISION.  iF INCREASED PAIN FEVER SWELLING OR DISCHARGE/BLEEDING CALL MD OFFICE.  STAPLE REMOVAL IN OFFICE 2 WEEKS POST OP.  CALL FOR APPOINTMENT.  PLEASE TAKE ASPIRIN 81MG EVERY 12 HOURS FOR THE NEXT 35 DAYS FOR BLOOD CLOT PREVENTION.  START TONIGHT.  ICE TO KNEE PLACE OVER A TOWEL, 20 MINUTES ON 20 MINUTES OFF.  PAIN MEDS AS PRESCRIBED.  CAN TAKE OVER THE COUNTER TYLENOL.  PROTONIX FOR HEARTBURN PREVENTION.  SENNA STOOL SOFTENER AND NEEDED.  WEIGHT BEARING AS TOLERATED WITH A WALKER.  CALL OFFICE FOR APPOINTMENT

## 2022-09-08 NOTE — DISCHARGE NOTE PROVIDER - CARE PROVIDER_API CALL
Jean Paul Womack Mekhi  Orthopedic Surgery  [address] [place] [id number]  Phone: [phone]  Fax: [phone]  Follow Up Time: 2 weeks   Jean Paul Womack Mekhi  Orthopedic Surgery  [address] [place] [address]  Phone: [phone]  Fax: [phone]  Follow Up Time: 2 weeks    Matthew Dobbins)  Internal Medicine; Nephrology  [address] [facility], [address]  [place], [place] [id number]  Phone: [phone]  Fax: [phone]  Follow Up Time: 1 week

## 2022-09-08 NOTE — DISCHARGE NOTE PROVIDER - CARE PROVIDERS DIRECT ADDRESSES
,[email or web address],DirectAddress_Unknown

## 2022-09-08 NOTE — PHYSICAL THERAPY INITIAL EVALUATION ADULT - GENERAL OBSERVATIONS, REHAB EVAL
18:00- 18:30 Chart reviewed. Order recived.  Patient available to be seen for Pt, confirmed with RN. pt encountered  Semi schultz in the bed denies pain, and agrees to participate in session, +Heplock , Lt knee Acewrap , BLE Compression stocking / Sequential  ,NAD.

## 2022-09-08 NOTE — DISCHARGE NOTE PROVIDER - PROVIDER TOKENS
PROVIDER:[TOKEN:[[id number]],FOLLOWUP:[2 weeks]] PROVIDER:[TOKEN:[[id number]],FOLLOWUP:[2 weeks]],PROVIDER:[TOKEN:[[id number]],FOLLOWUP:[1 week]]

## 2022-09-09 LAB
ANION GAP SERPL CALC-SCNC: 11 MMOL/L — SIGNIFICANT CHANGE UP (ref 7–14)
ANION GAP SERPL CALC-SCNC: 9 MMOL/L — SIGNIFICANT CHANGE UP (ref 7–14)
BUN SERPL-MCNC: 36 MG/DL — HIGH (ref 10–20)
BUN SERPL-MCNC: 40 MG/DL — HIGH (ref 10–20)
CALCIUM SERPL-MCNC: 8.8 MG/DL — SIGNIFICANT CHANGE UP (ref 8.5–10.1)
CALCIUM SERPL-MCNC: 9 MG/DL — SIGNIFICANT CHANGE UP (ref 8.5–10.1)
CHLORIDE SERPL-SCNC: 103 MMOL/L — SIGNIFICANT CHANGE UP (ref 98–110)
CHLORIDE SERPL-SCNC: 98 MMOL/L — SIGNIFICANT CHANGE UP (ref 98–110)
CO2 SERPL-SCNC: 23 MMOL/L — SIGNIFICANT CHANGE UP (ref 17–32)
CO2 SERPL-SCNC: 24 MMOL/L — SIGNIFICANT CHANGE UP (ref 17–32)
CREAT SERPL-MCNC: 1.3 MG/DL — SIGNIFICANT CHANGE UP (ref 0.7–1.5)
CREAT SERPL-MCNC: 1.5 MG/DL — SIGNIFICANT CHANGE UP (ref 0.7–1.5)
EGFR: 49 ML/MIN/1.73M2 — LOW
EGFR: 58 ML/MIN/1.73M2 — LOW
GLUCOSE SERPL-MCNC: 170 MG/DL — HIGH (ref 70–99)
GLUCOSE SERPL-MCNC: 200 MG/DL — HIGH (ref 70–99)
HCT VFR BLD CALC: 33.4 % — LOW (ref 42–52)
HGB BLD-MCNC: 11.6 G/DL — LOW (ref 14–18)
MCHC RBC-ENTMCNC: 32.2 PG — HIGH (ref 27–31)
MCHC RBC-ENTMCNC: 34.7 G/DL — SIGNIFICANT CHANGE UP (ref 32–37)
MCV RBC AUTO: 92.8 FL — SIGNIFICANT CHANGE UP (ref 80–94)
NRBC # BLD: 0 /100 WBCS — SIGNIFICANT CHANGE UP (ref 0–0)
PLATELET # BLD AUTO: 149 K/UL — SIGNIFICANT CHANGE UP (ref 130–400)
POTASSIUM SERPL-MCNC: 4.5 MMOL/L — SIGNIFICANT CHANGE UP (ref 3.5–5)
POTASSIUM SERPL-MCNC: 5 MMOL/L — SIGNIFICANT CHANGE UP (ref 3.5–5)
POTASSIUM SERPL-SCNC: 4.5 MMOL/L — SIGNIFICANT CHANGE UP (ref 3.5–5)
POTASSIUM SERPL-SCNC: 5 MMOL/L — SIGNIFICANT CHANGE UP (ref 3.5–5)
RBC # BLD: 3.6 M/UL — LOW (ref 4.7–6.1)
RBC # FLD: 12.2 % — SIGNIFICANT CHANGE UP (ref 11.5–14.5)
SODIUM SERPL-SCNC: 130 MMOL/L — LOW (ref 135–146)
SODIUM SERPL-SCNC: 138 MMOL/L — SIGNIFICANT CHANGE UP (ref 135–146)
WBC # BLD: 9.3 K/UL — SIGNIFICANT CHANGE UP (ref 4.8–10.8)
WBC # FLD AUTO: 9.3 K/UL — SIGNIFICANT CHANGE UP (ref 4.8–10.8)

## 2022-09-09 PROCEDURE — 76770 US EXAM ABDO BACK WALL COMP: CPT | Mod: 26

## 2022-09-09 PROCEDURE — 99221 1ST HOSP IP/OBS SF/LOW 40: CPT

## 2022-09-09 RX ORDER — SODIUM CHLORIDE 9 MG/ML
1000 INJECTION INTRAMUSCULAR; INTRAVENOUS; SUBCUTANEOUS
Refills: 0 | Status: DISCONTINUED | OUTPATIENT
Start: 2022-09-09 | End: 2022-09-09

## 2022-09-09 RX ORDER — ASPIRIN/CALCIUM CARB/MAGNESIUM 324 MG
1 TABLET ORAL
Qty: 70 | Refills: 0
Start: 2022-09-09 | End: 2022-10-13

## 2022-09-09 RX ORDER — SENNA PLUS 8.6 MG/1
2 TABLET ORAL
Qty: 28 | Refills: 0
Start: 2022-09-09 | End: 2022-09-22

## 2022-09-09 RX ORDER — OXYCODONE HYDROCHLORIDE 5 MG/1
1 TABLET ORAL
Qty: 16 | Refills: 0
Start: 2022-09-09 | End: 2022-09-12

## 2022-09-09 RX ORDER — ACETAMINOPHEN 500 MG
2 TABLET ORAL
Qty: 0 | Refills: 0 | DISCHARGE
Start: 2022-09-09

## 2022-09-09 RX ORDER — SODIUM CHLORIDE 9 MG/ML
1000 INJECTION INTRAMUSCULAR; INTRAVENOUS; SUBCUTANEOUS
Refills: 0 | Status: DISCONTINUED | OUTPATIENT
Start: 2022-09-09 | End: 2022-09-10

## 2022-09-09 RX ORDER — TRAMADOL HYDROCHLORIDE 50 MG/1
1 TABLET ORAL
Qty: 30 | Refills: 0
Start: 2022-09-09 | End: 2022-09-13

## 2022-09-09 RX ORDER — PANTOPRAZOLE SODIUM 20 MG/1
1 TABLET, DELAYED RELEASE ORAL
Qty: 30 | Refills: 0
Start: 2022-09-09 | End: 2022-10-08

## 2022-09-09 RX ORDER — LANOLIN ALCOHOL/MO/W.PET/CERES
3 CREAM (GRAM) TOPICAL ONCE
Refills: 0 | Status: COMPLETED | OUTPATIENT
Start: 2022-09-09 | End: 2022-09-09

## 2022-09-09 RX ADMIN — Medication 650 MILLIGRAM(S): at 23:51

## 2022-09-09 RX ADMIN — Medication 650 MILLIGRAM(S): at 12:03

## 2022-09-09 RX ADMIN — OXYCODONE HYDROCHLORIDE 5 MILLIGRAM(S): 5 TABLET ORAL at 07:32

## 2022-09-09 RX ADMIN — Medication 1 TABLET(S): at 12:03

## 2022-09-09 RX ADMIN — POLYETHYLENE GLYCOL 3350 17 GRAM(S): 17 POWDER, FOR SOLUTION ORAL at 20:32

## 2022-09-09 RX ADMIN — TAMSULOSIN HYDROCHLORIDE 0.4 MILLIGRAM(S): 0.4 CAPSULE ORAL at 20:31

## 2022-09-09 RX ADMIN — Medication 20 MILLIGRAM(S): at 06:24

## 2022-09-09 RX ADMIN — OXYCODONE HYDROCHLORIDE 5 MILLIGRAM(S): 5 TABLET ORAL at 08:00

## 2022-09-09 RX ADMIN — TRAMADOL HYDROCHLORIDE 50 MILLIGRAM(S): 50 TABLET ORAL at 05:39

## 2022-09-09 RX ADMIN — Medication 25 MICROGRAM(S): at 05:38

## 2022-09-09 RX ADMIN — Medication 15 MILLIGRAM(S): at 06:25

## 2022-09-09 RX ADMIN — SENNA PLUS 2 TABLET(S): 8.6 TABLET ORAL at 20:31

## 2022-09-09 RX ADMIN — Medication 81 MILLIGRAM(S): at 07:32

## 2022-09-09 RX ADMIN — ATENOLOL 100 MILLIGRAM(S): 25 TABLET ORAL at 06:25

## 2022-09-09 RX ADMIN — Medication 650 MILLIGRAM(S): at 00:03

## 2022-09-09 RX ADMIN — Medication 15 MILLIGRAM(S): at 00:02

## 2022-09-09 RX ADMIN — Medication 15 MILLIGRAM(S): at 00:37

## 2022-09-09 RX ADMIN — ATORVASTATIN CALCIUM 20 MILLIGRAM(S): 80 TABLET, FILM COATED ORAL at 20:32

## 2022-09-09 RX ADMIN — Medication 15 MILLIGRAM(S): at 07:00

## 2022-09-09 RX ADMIN — Medication 100 MILLIGRAM(S): at 06:26

## 2022-09-09 RX ADMIN — Medication 650 MILLIGRAM(S): at 00:37

## 2022-09-09 RX ADMIN — TRAMADOL HYDROCHLORIDE 50 MILLIGRAM(S): 50 TABLET ORAL at 06:02

## 2022-09-09 RX ADMIN — AMLODIPINE BESYLATE 5 MILLIGRAM(S): 2.5 TABLET ORAL at 06:24

## 2022-09-09 RX ADMIN — FINASTERIDE 5 MILLIGRAM(S): 5 TABLET, FILM COATED ORAL at 12:01

## 2022-09-09 RX ADMIN — Medication 650 MILLIGRAM(S): at 07:00

## 2022-09-09 RX ADMIN — Medication 81 MILLIGRAM(S): at 20:31

## 2022-09-09 RX ADMIN — Medication 3 MILLIGRAM(S): at 23:50

## 2022-09-09 RX ADMIN — TRAMADOL HYDROCHLORIDE 50 MILLIGRAM(S): 50 TABLET ORAL at 23:51

## 2022-09-09 RX ADMIN — Medication 2 MILLIGRAM(S): at 12:01

## 2022-09-09 RX ADMIN — Medication 650 MILLIGRAM(S): at 13:00

## 2022-09-09 RX ADMIN — PANTOPRAZOLE SODIUM 40 MILLIGRAM(S): 20 TABLET, DELAYED RELEASE ORAL at 06:25

## 2022-09-09 RX ADMIN — Medication 650 MILLIGRAM(S): at 06:25

## 2022-09-09 NOTE — CONSULT NOTE ADULT - SUBJECTIVE AND OBJECTIVE BOX
76 year old year man s/p orthopedic procedure.    Today:  Seen at bedside, denies chest pain, SOB, palpitations, diaphoresis, lightheadedness.        REVIEW OF SYSTEMS:  CONSTITUTIONAL: No fever, weight loss, or fatigue  EYES: No eye pain, visual disturbances, or discharge  ENMT:  No difficulty hearing, tinnitus, vertigo; No sinus or throat pain  NECK: No pain or stiffness  RESPIRATORY: No cough, wheezing, chills or hemoptysis; No shortness of breath  CARDIOVASCULAR: No chest pain, palpitations, dizziness, or leg swelling  GASTROINTESTINAL: No abdominal or epigastric pain. No nausea, vomiting, or hematemesis; No diarrhea or constipation. No melena or hematochezia.  GENITOURINARY: No dysuria, frequency, hematuria, or incontinence  NEUROLOGICAL: No headaches, memory loss, loss of strength, numbness, or tremors  SKIN: No itching, burning, rashes, or lesions   LYMPH NODES: No enlarged glands  ENDOCRINE: No heat or cold intolerance; No hair loss  PSYCHIATRIC: No depression, anxiety, mood swings, or difficulty sleeping  HEME/LYMPH: No easy bruising, or bleeding gums  ALLERGY AND IMMUNOLOGIC: No hives or eczema      MEDICATIONS  (STANDING):  acetaminophen     Tablet .. 650 milliGRAM(s) Oral every 6 hours  amLODIPine   Tablet 5 milliGRAM(s) Oral daily  aspirin enteric coated 81 milliGRAM(s) Oral every 12 hours  ATENolol  Tablet 100 milliGRAM(s) Oral daily  atorvastatin 20 milliGRAM(s) Oral at bedtime  chlorhexidine 2% Cloths 1 Application(s) Topical <User Schedule>  finasteride 5 milliGRAM(s) Oral daily  levothyroxine 25 MICROGram(s) Oral daily  multivitamin 1 Tablet(s) Oral daily  pantoprazole    Tablet 40 milliGRAM(s) Oral before breakfast  polyethylene glycol 3350 17 Gram(s) Oral at bedtime  senna 2 Tablet(s) Oral at bedtime  sodium chloride 0.9%. 1000 milliLiter(s) (120 mL/Hr) IV Continuous <Continuous>  tamsulosin 0.4 milliGRAM(s) Oral at bedtime    MEDICATIONS  (PRN):  aluminum hydroxide/magnesium hydroxide/simethicone Suspension 30 milliLiter(s) Oral four times a day PRN Indigestion  magnesium hydroxide Suspension 30 milliLiter(s) Oral daily PRN Constipation  ondansetron Injectable 4 milliGRAM(s) IV Push every 6 hours PRN Nausea and/or Vomiting  oxyCODONE    IR 5 milliGRAM(s) Oral every 4 hours PRN breakthrough pain  traMADol 50 milliGRAM(s) Oral every 4 hours PRN Mild Pain (1 - 3)      Allergies  No Known Allergies          Vital Signs Last 24 Hrs  T(C): 36.3 (09 Sep 2022 12:09), Max: 36.3 (08 Sep 2022 16:30)  T(F): 97.4 (09 Sep 2022 12:09), Max: 97.4 (09 Sep 2022 12:09)  HR: 65 (09 Sep 2022 12:09) (54 - 65)  BP: 145/71 (09 Sep 2022 12:09) (124/59 - 177/79)  RR: 16 (09 Sep 2022 12:09) (16 - 20)  SpO2: 98% (08 Sep 2022 16:45) (97% - 99%)    Parameters below as of 08 Sep 2022 16:45  Patient On (Oxygen Delivery Method): room air        PHYSICAL EXAM:  GENERAL: NAD, well-groomed, well-developed  HEAD:  Atraumatic, Normocephalic  EYES: EOMI, PERRLA, conjunctiva and sclera clear  ENMT: No tonsillar erythema, exudates, or enlargement; Moist mucous membranes, Good dentition, No lesions  NECK: Supple, No JVD, Normal thyroid  NERVOUS SYSTEM:  Alert & Oriented X3, Good concentration  CHEST/LUNG: CTA bilaterally; No rales, rhonchi, wheezing, or rubs  HEART: Regular rate and rhythm; No murmurs, rubs, or gallops  ABDOMEN: Soft, Nontender, Nondistended; Bowel sounds present      LABS:                        11.6   9.30  )-----------( 149      ( 09 Sep 2022 07:19 )             33.4     09-09    138  |  103  |  36<H>  ----------------------------<  170<H>  4.5   |  24  |  1.3    Ca    8.8      09 Sep 2022 07:19

## 2022-09-09 NOTE — OCCUPATIONAL THERAPY INITIAL EVALUATION ADULT - GENERAL OBSERVATIONS, REHAB EVAL
10:02-10:28 Chart reviewed, ok to treat by Occupational Therapist as confirmed by RN Channing, Pt received Chery's in bed +IV + aquacel left knee in NAD. Pt in agreement with OT IE.

## 2022-09-09 NOTE — PROGRESS NOTE ADULT - ASSESSMENT
s/p left tkr pod 0     pain control  dvt proph   am labs   pt ot   abx 24 hours   incentive   wbat   med cs   fu void   dispo planning 
s/p left tkr pod 1     pain control   dvt proph   am labs   pt ot   wbat   fu void   dispo planning

## 2022-09-09 NOTE — PROGRESS NOTE ADULT - SUBJECTIVE AND OBJECTIVE BOX
s/p left tkr pod 1   pt seen at bedside mild pain, is up and ambulating.  whitehead placed and dcd at midnight for urinary retention.  has yet to void in am     Vital Signs Last 24 Hrs  T(C): 36.2 (09 Sep 2022 04:00), Max: 36.7 (08 Sep 2022 09:12)  T(F): 97.2 (09 Sep 2022 04:00), Max: 98 (08 Sep 2022 09:12)  HR: 57 (09 Sep 2022 04:00) (54 - 65)  BP: 168/80 (09 Sep 2022 04:00) (142/68 - 185/82)  BP(mean): --  RR: 18 (09 Sep 2022 04:00) (16 - 20)  SpO2: 98% (08 Sep 2022 16:45) (97% - 99%)    left knee: dressing removed aquacell in place c/d/i   nvid df/pf intact   calf soft nt   wendy and ipc in place b/l   rolled towel under the ankle 
post op note    s/p left tkr pod 0   pt seen at bedside pain well controlled is up and ambulating      Vital Signs Last 24 Hrs  T(C): 35.8 (08 Sep 2022 17:30), Max: 36.7 (08 Sep 2022 09:12)  T(F): 96.5 (08 Sep 2022 17:30), Max: 98 (08 Sep 2022 09:12)  HR: 59 (08 Sep 2022 17:30) (54 - 62)  BP: 177/79 (08 Sep 2022 17:30) (142/68 - 185/82)  BP(mean): --  RR: 18 (08 Sep 2022 17:30) (16 - 20)  SpO2: 98% (08 Sep 2022 16:45) (97% - 99%)    left knee: dressing in place c/d/i   nvid df/pf intact   sensation intact   wendy in place b/l   calf soft nt

## 2022-09-09 NOTE — OCCUPATIONAL THERAPY INITIAL EVALUATION ADULT - IMPAIRED TRANSFERS: SHOWER, REHAB EVAL
[facility] has questions regarding the diclofenac-gabapentin-lidocaine-traMADOL 5-6-5-10 % cream. Please call to discuss.    [phone]   LLE/decreased ROM/decreased strength

## 2022-09-09 NOTE — CHART NOTE - NSCHARTNOTEFT_GEN_A_CORE
pt with worsening renal function post op kortney  discussed with dr greenwood.  will transfer to medicine service   discharge paperwork from orthopedic standpoint is completed.  discharge medication prescriptions were sent to pharmacy.  detailed instruction including meds in discharge paperwork   if any changes to medications and medical plan please update paperwork     nsaids discontinued   enalapril is being held.  please address and give dc instructions on when to resume on paperwork   sign out given to medicine pa s/p left tkr pod 1   last night patient developed urinary retention.  bladder scan 450cc unable to void.  Garcia placed and was discontinued at midnight   this am pt still unable to void.  bladder scan x 3 shows <5cc of urine    official renal bladder sono was performed and there was 133cc.  patient was able to void and had 6 cc of urine remaining   morning labs returned and showed a significant drop in gfr  he was placed on fluids [id number] per hour and was taking in po fluid intake  repeat lab at 1500 showed worsening of gfr and creatine   discussed with dr greenwood.  will transfer to medicine service   discharge paperwork from orthopedic standpoint is completed.  discharge medication prescriptions were sent to pharmacy.  detailed instruction including meds in discharge paperwork   if any changes to medications and medical plan please update paperwork     nsaids discontinued   enalapril is being held.  please address and give dc instructions on when to resume on paperwork   sign out given to on call medicine pa [id number]  dr montalvo aware s/p left tkr pod 1   last night patient developed urinary retention.  bladder scan 450cc unable to void.  Garcia placed and was discontinued at midnight   this am pt still unable to void.  bladder scan x 3 shows <5cc of urine    official renal bladder sono was performed and there was 133cc.  patient was able to void and had 6 cc of urine remaining   morning labs returned and showed a significant drop in gfr  he was placed on fluids [id number] per hour and was taking in po fluid intake  repeat lab at 1500 showed worsening of gfr and creatine   discussed with dr greenwood.  will transfer to medicine service   discharge paperwork from orthopedic standpoint is completed.  discharge medication prescriptions were sent to pharmacy.    any questions regarding  discharge/post op care/medications are on discharge paperwork   if any changes to medications and medical plan please update paperwork     nsaids discontinued   enalapril is being held.  please address and give dc instructions on when to resume on paperwork   sign out given to on call medicine pa [id number]  dr montalvo aware

## 2022-09-09 NOTE — CONSULT NOTE ADULT - ASSESSMENT
76 year old year man s/p orthopedic procedure.    Acute Kidney Injury:  -BUN/Cr- 27  -Likely 2' to dehydration s/p procedure  -Agree with stopping lisinopril for now  -Would continue to hydrate with IVF as ordered  -Can recheck BMP at 3 pm and if Cr has improved can be discharged with outpatient follow up.  Will follow 3 pm labs.  Discussed with orthopedics team.

## 2022-09-09 NOTE — CHART NOTE - NSCHARTNOTEFT_GEN_A_CORE
Renal function did not improve, will transfer to medicine service and work up kortney; nephrology consult, urine studies, continue IVF.

## 2022-09-10 ENCOUNTER — TRANSCRIPTION ENCOUNTER (OUTPATIENT)
Age: 77
End: 2022-09-10

## 2022-09-10 VITALS — OXYGEN SATURATION: 96 %

## 2022-09-10 LAB
ALBUMIN SERPL ELPH-MCNC: 3.4 G/DL — LOW (ref 3.5–5.2)
ALP SERPL-CCNC: 41 U/L — SIGNIFICANT CHANGE UP (ref 30–115)
ALT FLD-CCNC: 11 U/L — SIGNIFICANT CHANGE UP (ref 0–41)
ANION GAP SERPL CALC-SCNC: 7 MMOL/L — SIGNIFICANT CHANGE UP (ref 7–14)
AST SERPL-CCNC: 23 U/L — SIGNIFICANT CHANGE UP (ref 0–41)
BASOPHILS # BLD AUTO: 0.01 K/UL — SIGNIFICANT CHANGE UP (ref 0–0.2)
BASOPHILS NFR BLD AUTO: 0.2 % — SIGNIFICANT CHANGE UP (ref 0–1)
BILIRUB SERPL-MCNC: 0.4 MG/DL — SIGNIFICANT CHANGE UP (ref 0.2–1.2)
BUN SERPL-MCNC: 34 MG/DL — HIGH (ref 10–20)
CALCIUM SERPL-MCNC: 8.7 MG/DL — SIGNIFICANT CHANGE UP (ref 8.5–10.1)
CHLORIDE SERPL-SCNC: 106 MMOL/L — SIGNIFICANT CHANGE UP (ref 98–110)
CO2 SERPL-SCNC: 27 MMOL/L — SIGNIFICANT CHANGE UP (ref 17–32)
CREAT SERPL-MCNC: 0.9 MG/DL — SIGNIFICANT CHANGE UP (ref 0.7–1.5)
EGFR: 90 ML/MIN/1.73M2 — SIGNIFICANT CHANGE UP
EOSINOPHIL # BLD AUTO: 0.01 K/UL — SIGNIFICANT CHANGE UP (ref 0–0.7)
EOSINOPHIL NFR BLD AUTO: 0.2 % — SIGNIFICANT CHANGE UP (ref 0–8)
GLUCOSE SERPL-MCNC: 116 MG/DL — HIGH (ref 70–99)
HCT VFR BLD CALC: 29.1 % — LOW (ref 42–52)
HGB BLD-MCNC: 9.8 G/DL — LOW (ref 14–18)
IMM GRANULOCYTES NFR BLD AUTO: 0.3 % — SIGNIFICANT CHANGE UP (ref 0.1–0.3)
LYMPHOCYTES # BLD AUTO: 0.94 K/UL — LOW (ref 1.2–3.4)
LYMPHOCYTES # BLD AUTO: 14.9 % — LOW (ref 20.5–51.1)
MAGNESIUM SERPL-MCNC: 1.9 MG/DL — SIGNIFICANT CHANGE UP (ref 1.8–2.4)
MCHC RBC-ENTMCNC: 31.8 PG — HIGH (ref 27–31)
MCHC RBC-ENTMCNC: 33.7 G/DL — SIGNIFICANT CHANGE UP (ref 32–37)
MCV RBC AUTO: 94.5 FL — HIGH (ref 80–94)
MONOCYTES # BLD AUTO: 0.78 K/UL — HIGH (ref 0.1–0.6)
MONOCYTES NFR BLD AUTO: 12.3 % — HIGH (ref 1.7–9.3)
NEUTROPHILS # BLD AUTO: 4.56 K/UL — SIGNIFICANT CHANGE UP (ref 1.4–6.5)
NEUTROPHILS NFR BLD AUTO: 72.1 % — SIGNIFICANT CHANGE UP (ref 42.2–75.2)
NRBC # BLD: 0 /100 WBCS — SIGNIFICANT CHANGE UP (ref 0–0)
PHOSPHATE SERPL-MCNC: 3.3 MG/DL — SIGNIFICANT CHANGE UP (ref 2.1–4.9)
PLATELET # BLD AUTO: 118 K/UL — LOW (ref 130–400)
POTASSIUM SERPL-MCNC: 4.9 MMOL/L — SIGNIFICANT CHANGE UP (ref 3.5–5)
POTASSIUM SERPL-SCNC: 4.9 MMOL/L — SIGNIFICANT CHANGE UP (ref 3.5–5)
PROT SERPL-MCNC: 4.6 G/DL — LOW (ref 6–8)
RBC # BLD: 3.08 M/UL — LOW (ref 4.7–6.1)
RBC # FLD: 12.8 % — SIGNIFICANT CHANGE UP (ref 11.5–14.5)
SODIUM SERPL-SCNC: 140 MMOL/L — SIGNIFICANT CHANGE UP (ref 135–146)
WBC # BLD: 6.32 K/UL — SIGNIFICANT CHANGE UP (ref 4.8–10.8)
WBC # FLD AUTO: 6.32 K/UL — SIGNIFICANT CHANGE UP (ref 4.8–10.8)

## 2022-09-10 PROCEDURE — 99239 HOSP IP/OBS DSCHRG MGMT >30: CPT

## 2022-09-10 RX ORDER — SENNA PLUS 8.6 MG/1
2 TABLET ORAL
Qty: 28 | Refills: 0
Start: 2022-09-10 | End: 2022-09-23

## 2022-09-10 RX ORDER — TRAMADOL HYDROCHLORIDE 50 MG/1
1 TABLET ORAL
Qty: 30 | Refills: 0
Start: 2022-09-10 | End: 2022-09-14

## 2022-09-10 RX ORDER — ASPIRIN/CALCIUM CARB/MAGNESIUM 324 MG
1 TABLET ORAL
Qty: 70 | Refills: 0
Start: 2022-09-10 | End: 2022-10-14

## 2022-09-10 RX ORDER — PANTOPRAZOLE SODIUM 20 MG/1
1 TABLET, DELAYED RELEASE ORAL
Qty: 30 | Refills: 0
Start: 2022-09-10 | End: 2022-10-09

## 2022-09-10 RX ADMIN — Medication 650 MILLIGRAM(S): at 06:33

## 2022-09-10 RX ADMIN — ATENOLOL 100 MILLIGRAM(S): 25 TABLET ORAL at 06:34

## 2022-09-10 RX ADMIN — POLYETHYLENE GLYCOL 3350 17 GRAM(S): 17 POWDER, FOR SOLUTION ORAL at 21:48

## 2022-09-10 RX ADMIN — TAMSULOSIN HYDROCHLORIDE 0.4 MILLIGRAM(S): 0.4 CAPSULE ORAL at 21:47

## 2022-09-10 RX ADMIN — FINASTERIDE 5 MILLIGRAM(S): 5 TABLET, FILM COATED ORAL at 11:49

## 2022-09-10 RX ADMIN — OXYCODONE HYDROCHLORIDE 5 MILLIGRAM(S): 5 TABLET ORAL at 06:36

## 2022-09-10 RX ADMIN — Medication 81 MILLIGRAM(S): at 09:35

## 2022-09-10 RX ADMIN — Medication 25 MICROGRAM(S): at 06:34

## 2022-09-10 RX ADMIN — Medication 1 TABLET(S): at 11:48

## 2022-09-10 RX ADMIN — SENNA PLUS 2 TABLET(S): 8.6 TABLET ORAL at 21:48

## 2022-09-10 RX ADMIN — AMLODIPINE BESYLATE 5 MILLIGRAM(S): 2.5 TABLET ORAL at 06:34

## 2022-09-10 RX ADMIN — Medication 650 MILLIGRAM(S): at 18:31

## 2022-09-10 RX ADMIN — PANTOPRAZOLE SODIUM 40 MILLIGRAM(S): 20 TABLET, DELAYED RELEASE ORAL at 06:34

## 2022-09-10 RX ADMIN — TRAMADOL HYDROCHLORIDE 50 MILLIGRAM(S): 50 TABLET ORAL at 00:35

## 2022-09-10 RX ADMIN — Medication 81 MILLIGRAM(S): at 20:55

## 2022-09-10 RX ADMIN — Medication 650 MILLIGRAM(S): at 00:35

## 2022-09-10 RX ADMIN — Medication 650 MILLIGRAM(S): at 11:49

## 2022-09-10 RX ADMIN — ATORVASTATIN CALCIUM 20 MILLIGRAM(S): 80 TABLET, FILM COATED ORAL at 21:47

## 2022-09-10 NOTE — DISCHARGE NOTE NURSING/CASE MANAGEMENT/SOCIAL WORK - NSDCPEFALRISK_GEN_ALL_CORE
For information on Fall & Injury Prevention, visit: [email or web address] OR  [email or web address] OR  https://www.cdc.gov/steadi/patient.html

## 2022-09-10 NOTE — CHART NOTE - NSCHARTNOTEFT_GEN_A_CORE
Pt for d/c today. All new prescriptions were sent to [place] [facility] by ortho PA previously, but d/c was delayed by 1 day and now [place] pharmacy closed until Monday.   Will resend all new prescriptions (aspirin, Protonix, tramadol and senna) except for oxycodone to [facility], so he can pick them up tofay. If pt needs oxycodone, he can pick this rx up on Monday from [place] pharmacy.

## 2022-09-10 NOTE — DISCHARGE NOTE NURSING/CASE MANAGEMENT/SOCIAL WORK - PATIENT PORTAL LINK FT
Pt requesting refill of   Requested Prescriptions     Pending Prescriptions Disp Refills   • METOPROLOL SUCCINATE ER 50 MG Oral Tablet 24 Hr [Pharmacy Med Name: METOPROLOL SUCC ER 50 MG TAB] 90 tablet 1     Sig: TAKE 1 TABLET BY MOUTH EVERY DAY     No prot You can access the FollowMyHealth Patient Portal offered by [facility] by registering at the following website: [email or web address]. By joining [facility]’s FollowMyHealth portal, you will also be able to view your health information using other applications (apps) compatible with our system.

## 2022-09-12 PROBLEM — N40.0 BENIGN PROSTATIC HYPERPLASIA WITHOUT LOWER URINARY TRACT SYMPTOMS: Chronic | Status: ACTIVE | Noted: 2022-08-23

## 2022-09-12 PROBLEM — E03.9 HYPOTHYROIDISM, UNSPECIFIED: Chronic | Status: ACTIVE | Noted: 2021-07-21

## 2022-09-12 PROBLEM — I21.9 ACUTE MYOCARDIAL INFARCTION, UNSPECIFIED: Chronic | Status: ACTIVE | Noted: 2022-08-23

## 2022-09-12 PROBLEM — Z87.891 PERSONAL HISTORY OF NICOTINE DEPENDENCE: Chronic | Status: ACTIVE | Noted: 2022-08-23

## 2022-09-12 PROBLEM — M19.90 UNSPECIFIED OSTEOARTHRITIS, UNSPECIFIED SITE: Chronic | Status: ACTIVE | Noted: 2022-08-23

## 2022-09-14 LAB — SURGICAL PATHOLOGY STUDY: SIGNIFICANT CHANGE UP

## 2022-09-22 ENCOUNTER — APPOINTMENT (OUTPATIENT)
Dept: ORTHOPEDIC SURGERY | Facility: CLINIC | Age: 77
End: 2022-09-22

## 2022-09-22 DIAGNOSIS — Z98.890 OTHER SPECIFIED POSTPROCEDURAL STATES: ICD-10-CM

## 2022-09-22 DIAGNOSIS — Z48.02 ENCOUNTER FOR REMOVAL OF SUTURES: ICD-10-CM

## 2022-09-22 PROCEDURE — 99024 POSTOP FOLLOW-UP VISIT: CPT

## 2022-09-22 NOTE — HISTORY OF PRESENT ILLNESS
[de-identified] : 77 year old male s/p left total knee replacement presents to the office today for his 2 week follow up. He is here today for staple removal. Patient is ambulating with a cane today. He reports taking Tramadol for pain with good pain control. He has continued to do physical therapy at home and will be completing this within the week. Patient states he has been taking Aspirin 81 mg twice daily for DVT prophylaxis. Patient denies any fevers, chills, drainage from the incision site, chest pain, or shortness of breath.\par

## 2022-09-22 NOTE — PHYSICAL EXAM
[Walker] : ambulates with walker [Normal] : Alert and in no acute distress [de-identified] : Left Knee\par Inspection: no effusion\par Wounds:  Incision is clean and dry, staples are intact\par Alignment: normal.\par Palpation: no specific tenderness on palpation.\par ROM: 5-80 degrees\par Muscle Test: good quad strength.\par Leg examination: calf is soft and non-tender. [de-identified] : Sensation grossly intact about bilateral lower extremities.\par

## 2022-09-22 NOTE — PHYSICAL THERAPY INITIAL EVALUATION ADULT - MANUAL MUSCLE TESTING RESULTS, REHAB EVAL
Lot #: [id number] Lot #: [id number] RLE MS Strength 4/5 Grossly graded , LLE Knee 3-/5 , Hip and ankle 3/5 . refer OT eval for BUE

## 2022-09-22 NOTE — ASSESSMENT
[FreeTextEntry1] : 77 year old male approximately two weeks status post left total knee replacement. Staples were removed from the incision site and steri-strips were applied. Pt was instructed he may shower, allowing the soap and water to run over the incision site, but not to directly scrub over the wound. Pt is to continue Aspirin 81 mg twice daily for DVT prophylaxis. He was given a script to continue physical therapy outpatient. Pt is to follow up in four weeks for reassessment. In the interim, if he develops any fevers, erythema, drainage from the incision site, or any concerning symptoms, we will see him prior to his scheduled appointment.\par

## 2022-09-22 NOTE — REASON FOR VISIT
[Post-Operative Visit] : a post-operative visit for [Total Knee Replacement Surgery, Aftercare] : total knee replacement surgery, aftercare

## 2022-10-19 ENCOUNTER — APPOINTMENT (OUTPATIENT)
Dept: ORTHOPEDIC SURGERY | Facility: CLINIC | Age: 77
End: 2022-10-19

## 2022-10-19 PROCEDURE — 99024 POSTOP FOLLOW-UP VISIT: CPT

## 2022-10-19 PROCEDURE — 73562 X-RAY EXAM OF KNEE 3: CPT | Mod: LT

## 2022-10-19 NOTE — PHYSICAL EXAM
[de-identified] : Left Knee\par Inspection: no effusion or erythema.\par Wounds: well healed incision \par Alignment: normal.\par Palpation: no specific tenderness on palpation.\par ROM: \par Ligamentous laxity: all ligaments appear stable\par Muscle Test: good quad strength.\par Leg examination: calf is soft and non-tender. [de-identified] : Radiographs done today AP lateral and skyline of the left knee shows well aligned cemented PS TKA

## 2022-10-19 NOTE — ASSESSMENT
[FreeTextEntry1] : S/p JOSEF 9/8/2022 presents to the office for a 6 week follow up visit. He is doing very well. He is ambulating without a limp or cane. He can walk unlimited distances, get in and out of chairs and in and out of cars. He has complete pain relief. He is a  and he would like to go back to work next week. He is doing extremely well and can f/u for his 3 month follow up visit.

## 2022-10-19 NOTE — REASON FOR VISIT
[Post-Operative Visit] : a post-operative visit for [Artificial Knee Joint] : an artificial knee joint

## 2022-12-14 ENCOUNTER — APPOINTMENT (OUTPATIENT)
Dept: ORTHOPEDIC SURGERY | Facility: CLINIC | Age: 77
End: 2022-12-14

## 2022-12-14 PROCEDURE — 73562 X-RAY EXAM OF KNEE 3: CPT | Mod: LT

## 2022-12-14 PROCEDURE — 99213 OFFICE O/P EST LOW 20 MIN: CPT

## 2022-12-14 NOTE — PHYSICAL EXAM
[de-identified] : Left Knee\par Inspection: no effusion or erythema.\par Wounds: well healed incision \par Alignment: normal.\par Palpation: no specific tenderness on palpation.\par ROM: 0-110 degrees \par Ligamentous laxity: all ligaments appear stable\par Muscle Test: good quad strength.\par Leg examination: calf is soft and non-tender. [de-identified] : Radiographs done today AP lateral and skyline of the left knee shows well aligned cemented PS TKA

## 2022-12-14 NOTE — HISTORY OF PRESENT ILLNESS
[de-identified] : 77 year old male s/p left total knee replacement presents to the office today for his 3 month follow up. He reports pain only while sleeping mostly. He is not taking anything for pain at this point and reports having returned to work on 10/24/2022. He states he last did PT the week of Thanksgiving and has been working often so has been unable to get there. Patient does exercise using a stationary bike at home. Patient denies any problem with ascending stairs and reports doing great with ambulation. Overall, patient reports doing well.

## 2022-12-14 NOTE — ASSESSMENT
[FreeTextEntry1] : S/p JOSEF 9/8/2022 presents to the office for a 3 month follow up visit. He is doing well. He is back to work. He still is doing to PT. But i told him he can stop. We will see him back in Sept for his one year post op visit.

## 2023-02-01 ENCOUNTER — APPOINTMENT (OUTPATIENT)
Dept: UROLOGY | Facility: CLINIC | Age: 78
End: 2023-02-01
Payer: COMMERCIAL

## 2023-02-01 VITALS
WEIGHT: 172 LBS | HEART RATE: 63 BPM | DIASTOLIC BLOOD PRESSURE: 76 MMHG | SYSTOLIC BLOOD PRESSURE: 151 MMHG | BODY MASS INDEX: 26.07 KG/M2 | HEIGHT: 68 IN

## 2023-02-01 PROCEDURE — 99213 OFFICE O/P EST LOW 20 MIN: CPT

## 2023-02-01 RX ORDER — FENOFIBRATE 145 MG/1
145 TABLET, COATED ORAL DAILY
Qty: 30 | Refills: 5 | Status: ACTIVE | COMMUNITY
Start: 2021-04-07

## 2023-02-01 RX ORDER — ATORVASTATIN CALCIUM 20 MG/1
20 TABLET, FILM COATED ORAL
Qty: 90 | Refills: 3 | Status: ACTIVE | COMMUNITY
Start: 2021-04-07

## 2023-02-01 RX ORDER — LEVOTHYROXINE SODIUM 0.03 MG/1
25 TABLET ORAL DAILY
Qty: 90 | Refills: 3 | Status: ACTIVE | COMMUNITY
Start: 2022-05-04

## 2023-02-01 RX ORDER — ENALAPRIL MALEATE 20 MG/1
20 TABLET ORAL TWICE DAILY
Qty: 180 | Refills: 3 | Status: ACTIVE | COMMUNITY
Start: 2021-04-07

## 2023-02-01 RX ORDER — TRAMADOL HYDROCHLORIDE 50 MG/1
50 TABLET, COATED ORAL
Qty: 28 | Refills: 0 | Status: COMPLETED | COMMUNITY
Start: 2022-09-22 | End: 2023-02-01

## 2023-02-01 RX ORDER — OXYCODONE 5 MG/1
5 TABLET ORAL
Qty: 14 | Refills: 0 | Status: COMPLETED | COMMUNITY
Start: 2022-10-07 | End: 2023-02-01

## 2023-02-28 NOTE — ASSESSMENT
[FreeTextEntry1] : 77-year-old with BPH and bothersome lower urinary tract symptoms currently on dual therapy tamsulosin 0.4 mg daily and finasteride 5 mg daily. Patient does experience side effect of Retrograde ejaculation \par \par Patient continues to have bothersome urinary symptoms including nocturia 2-3 times a night, urinary urgency, and urinary frequency.\par \par Today we had prolonged discussion about minimally invasive prostate procedures and invasive prostate procedures.  Risks benefits and alternatives reviewed and patient was provided with pamphlets for at home educational purposes.\par We also discussed options of switching tamsulosin to silodosin to evaluate any improvement in urinary symptoms.\par \par After discussion patient would like to reassess in June 2023 as he is a  and if he were to have a procedure he would want to have it after summer school.\par \par Patient will continue tamsulosin and finasteride\par Tadalafil as needed for erections

## 2023-02-28 NOTE — HISTORY OF PRESENT ILLNESS
[FreeTextEntry1] : Prince is a 77-year-old male with chief complaint of frequent urination.  He has history of BPH and bothersome lower urinary tract symptoms currently managed on dual therapy tamsulosin 0.4 mg daily and finasteride 5 mg daily.  Patient also has erectile dysfunction managed on tadalafil as needed.\par \par He presents to office for 6-month follow-up.  He states that in September 2022 he had knee replacement with postop retention requiring indwelling Garcia catheter for 2 days.  He states Garcia catheter was removed and he successfully voided on his own.  Patient states that since this he has been voiding on his own and his bladder scan PVR today is 65 mL.  He currently denies any dysuria and gross hematuria.\par \par History of 59 cc prostate.\par \par Patient request to discuss prostate procedures today.

## 2023-04-10 NOTE — ED ADULT TRIAGE NOTE - TEMPERATURE IN FAHRENHEIT (DEGREES F)
JAM placed phone call to Mercedes Dixon [phone]  and left a message for Myrick Gottron in admissions today. JAM left a voicemail and we are currently waiting on a response. Respectfully submitted,    Alaina FLOR, [facility]   [phone]    Electronically signed by BASIA Shepard, LSW on 4/10/2023 at 8:29 AM 98.5

## 2023-06-07 ENCOUNTER — APPOINTMENT (OUTPATIENT)
Dept: UROLOGY | Facility: CLINIC | Age: 78
End: 2023-06-07
Payer: COMMERCIAL

## 2023-06-07 PROCEDURE — 99214 OFFICE O/P EST MOD 30 MIN: CPT

## 2023-06-07 NOTE — HISTORY OF PRESENT ILLNESS
[FreeTextEntry1] : Prince is a 77-year-old male with chief complaint of frequent urination. He has history of BPH and bothersome lower urinary tract symptoms currently managed on dual therapy tamsulosin 0.4 mg daily and finasteride 5 mg daily. \par \par Patient also has erectile dysfunction managed on tadalafil 20mg -- more recently has been taking 5mg daily which has helped with both LUTS and erectile dysfunction (but not "rock hard")\par \par He states that in September 2022 he had knee replacement with postop retention requiring indwelling Garcia catheter for 2 days. He states Garcia catheter was removed and he successfully voided on his own. Patient states that since this he has been voiding on his own and his bladder scan PVR is 65 mL. He currently denies any dysuria and gross hematuria.\par \par April 2022\par songoram -- prosate 52g with pvr 9ml\par \par Nov 2022\par sonogram -- prostate 51g with pvr 9ml\par \par March 2023\par Ct -- left renal cyst - no kidney stones \par \par Today we had prolonged discussion about minimally invasive prostate procedures and invasive prostate procedures. Risks benefits and alternatives reviewed and patient was provided with pamphlets for at home educational purposes.\par

## 2023-06-15 ENCOUNTER — APPOINTMENT (OUTPATIENT)
Dept: UROLOGY | Facility: CLINIC | Age: 78
End: 2023-06-15
Payer: COMMERCIAL

## 2023-06-15 PROCEDURE — 81003 URINALYSIS AUTO W/O SCOPE: CPT | Mod: NC,QW

## 2023-06-15 PROCEDURE — 99214 OFFICE O/P EST MOD 30 MIN: CPT

## 2023-06-16 LAB
BILIRUB UR QL STRIP: NORMAL
COLLECTION METHOD: NORMAL
GLUCOSE UR-MCNC: NORMAL
HCG UR QL: 0.2 EU/DL
HGB UR QL STRIP.AUTO: NORMAL
KETONES UR-MCNC: NORMAL
LEUKOCYTE ESTERASE UR QL STRIP: NORMAL
NITRITE UR QL STRIP: NORMAL
PH UR STRIP: 5.5
PROT UR STRIP-MCNC: NORMAL
SP GR UR STRIP: 1.03

## 2023-06-16 NOTE — ASSESSMENT
[FreeTextEntry1] : Prince is a 77-year-old male with chief complaint of frequent urination. He has history of BPH and bothersome lower urinary tract symptoms currently managed on dual therapy tamsulosin 0.4 mg daily and finasteride 5 mg daily. \par \par Patient also has erectile dysfunction managed on tadalafil 20mg -- more recently has been taking 5mg daily which has helped with both LUTS and erectile dysfunction (but not "rock hard")\par \par He states that in September 2022 he had knee replacement with postop retention requiring indwelling Garcia catheter for 2 days. He states Garcia catheter was removed and he successfully voided on his own. Patient states that since this he has been voiding on his own and his bladder scan PVR is 65 mL. He currently denies any dysuria and gross hematuria.\par \par April 2022\par songoram -- prosate 52g with pvr 9ml\par \par Nov 2022\par sonogram -- prostate 51g with pvr 9ml\par \par March 2023\par Ct -- left renal cyst - no kidney stones \par \par June 2023\par cysto confirmed prostate obstruction with no protrusion into bladder

## 2023-06-20 ENCOUNTER — RESULT REVIEW (OUTPATIENT)
Age: 78
End: 2023-06-20

## 2023-06-20 ENCOUNTER — OUTPATIENT (OUTPATIENT)
Dept: OUTPATIENT SERVICES | Facility: HOSPITAL | Age: 78
LOS: 1 days | End: 2023-06-20
Payer: COMMERCIAL

## 2023-06-20 VITALS
HEIGHT: 68 IN | RESPIRATION RATE: 18 BRPM | WEIGHT: 181.22 LBS | HEART RATE: 63 BPM | OXYGEN SATURATION: 98 % | DIASTOLIC BLOOD PRESSURE: 80 MMHG | TEMPERATURE: 98 F | SYSTOLIC BLOOD PRESSURE: 163 MMHG

## 2023-06-20 DIAGNOSIS — Z01.818 ENCOUNTER FOR OTHER PREPROCEDURAL EXAMINATION: ICD-10-CM

## 2023-06-20 DIAGNOSIS — N40.1 BENIGN PROSTATIC HYPERPLASIA WITH LOWER URINARY TRACT SYMPTOMS: ICD-10-CM

## 2023-06-20 DIAGNOSIS — Z98.890 OTHER SPECIFIED POSTPROCEDURAL STATES: Chronic | ICD-10-CM

## 2023-06-20 DIAGNOSIS — I25.708 ATHEROSCLEROSIS OF CORONARY ARTERY BYPASS GRAFT(S), UNSPECIFIED, WITH OTHER FORMS OF ANGINA PECTORIS: Chronic | ICD-10-CM

## 2023-06-20 LAB
ALBUMIN SERPL ELPH-MCNC: 4.5 G/DL — SIGNIFICANT CHANGE UP (ref 3.5–5.2)
ALP SERPL-CCNC: 81 U/L — SIGNIFICANT CHANGE UP (ref 30–115)
ALT FLD-CCNC: 50 U/L — HIGH (ref 0–41)
ANION GAP SERPL CALC-SCNC: 10 MMOL/L — SIGNIFICANT CHANGE UP (ref 7–14)
APPEARANCE UR: CLEAR — SIGNIFICANT CHANGE UP
APTT BLD: 30.4 SEC — SIGNIFICANT CHANGE UP (ref 27–39.2)
AST SERPL-CCNC: 52 U/L — HIGH (ref 0–41)
BASOPHILS # BLD AUTO: 0.01 K/UL — SIGNIFICANT CHANGE UP (ref 0–0.2)
BASOPHILS NFR BLD AUTO: 0.2 % — SIGNIFICANT CHANGE UP (ref 0–1)
BILIRUB SERPL-MCNC: 0.6 MG/DL — SIGNIFICANT CHANGE UP (ref 0.2–1.2)
BILIRUB UR-MCNC: NEGATIVE — SIGNIFICANT CHANGE UP
BUN SERPL-MCNC: 31 MG/DL — HIGH (ref 10–20)
CALCIUM SERPL-MCNC: 9.1 MG/DL — SIGNIFICANT CHANGE UP (ref 8.4–10.5)
CHLORIDE SERPL-SCNC: 103 MMOL/L — SIGNIFICANT CHANGE UP (ref 98–110)
CO2 SERPL-SCNC: 23 MMOL/L — SIGNIFICANT CHANGE UP (ref 17–32)
COLOR SPEC: YELLOW — SIGNIFICANT CHANGE UP
CREAT SERPL-MCNC: 0.8 MG/DL — SIGNIFICANT CHANGE UP (ref 0.7–1.5)
DIFF PNL FLD: NEGATIVE — SIGNIFICANT CHANGE UP
EGFR: 92 ML/MIN/1.73M2 — SIGNIFICANT CHANGE UP
EOSINOPHIL # BLD AUTO: 0.07 K/UL — SIGNIFICANT CHANGE UP (ref 0–0.7)
EOSINOPHIL NFR BLD AUTO: 1.5 % — SIGNIFICANT CHANGE UP (ref 0–8)
GLUCOSE SERPL-MCNC: 165 MG/DL — HIGH (ref 70–99)
GLUCOSE UR QL: NEGATIVE — SIGNIFICANT CHANGE UP
HCT VFR BLD CALC: 39 % — LOW (ref 42–52)
HGB BLD-MCNC: 13.3 G/DL — LOW (ref 14–18)
IMM GRANULOCYTES NFR BLD AUTO: 0.7 % — HIGH (ref 0.1–0.3)
INR BLD: 1.05 RATIO — SIGNIFICANT CHANGE UP (ref 0.65–1.3)
KETONES UR-MCNC: NEGATIVE — SIGNIFICANT CHANGE UP
LEUKOCYTE ESTERASE UR-ACNC: NEGATIVE — SIGNIFICANT CHANGE UP
LYMPHOCYTES # BLD AUTO: 1.11 K/UL — LOW (ref 1.2–3.4)
LYMPHOCYTES # BLD AUTO: 24.4 % — SIGNIFICANT CHANGE UP (ref 20.5–51.1)
MCHC RBC-ENTMCNC: 31.6 PG — HIGH (ref 27–31)
MCHC RBC-ENTMCNC: 34.1 G/DL — SIGNIFICANT CHANGE UP (ref 32–37)
MCV RBC AUTO: 92.6 FL — SIGNIFICANT CHANGE UP (ref 80–94)
MONOCYTES # BLD AUTO: 0.44 K/UL — SIGNIFICANT CHANGE UP (ref 0.1–0.6)
MONOCYTES NFR BLD AUTO: 9.7 % — HIGH (ref 1.7–9.3)
NEUTROPHILS # BLD AUTO: 2.88 K/UL — SIGNIFICANT CHANGE UP (ref 1.4–6.5)
NEUTROPHILS NFR BLD AUTO: 63.5 % — SIGNIFICANT CHANGE UP (ref 42.2–75.2)
NITRITE UR-MCNC: NEGATIVE — SIGNIFICANT CHANGE UP
NRBC # BLD: 0 /100 WBCS — SIGNIFICANT CHANGE UP (ref 0–0)
PH UR: 6.5 — SIGNIFICANT CHANGE UP (ref 5–8)
PLATELET # BLD AUTO: 138 K/UL — SIGNIFICANT CHANGE UP (ref 130–400)
PMV BLD: 12.2 FL — HIGH (ref 7.4–10.4)
POTASSIUM SERPL-MCNC: 3.8 MMOL/L — SIGNIFICANT CHANGE UP (ref 3.5–5)
POTASSIUM SERPL-SCNC: 3.8 MMOL/L — SIGNIFICANT CHANGE UP (ref 3.5–5)
PROT SERPL-MCNC: 6.3 G/DL — SIGNIFICANT CHANGE UP (ref 6–8)
PROT UR-MCNC: SIGNIFICANT CHANGE UP
PROTHROM AB SERPL-ACNC: 12 SEC — SIGNIFICANT CHANGE UP (ref 9.95–12.87)
RBC # BLD: 4.21 M/UL — LOW (ref 4.7–6.1)
RBC # FLD: 12.8 % — SIGNIFICANT CHANGE UP (ref 11.5–14.5)
SODIUM SERPL-SCNC: 136 MMOL/L — SIGNIFICANT CHANGE UP (ref 135–146)
SP GR SPEC: 1.02 — SIGNIFICANT CHANGE UP (ref 1.01–1.03)
UROBILINOGEN FLD QL: SIGNIFICANT CHANGE UP
WBC # BLD: 4.54 K/UL — LOW (ref 4.8–10.8)
WBC # FLD AUTO: 4.54 K/UL — LOW (ref 4.8–10.8)

## 2023-06-20 PROCEDURE — 93005 ELECTROCARDIOGRAM TRACING: CPT

## 2023-06-20 PROCEDURE — 71046 X-RAY EXAM CHEST 2 VIEWS: CPT

## 2023-06-20 PROCEDURE — 93010 ELECTROCARDIOGRAM REPORT: CPT

## 2023-06-20 PROCEDURE — 85025 COMPLETE CBC W/AUTO DIFF WBC: CPT

## 2023-06-20 PROCEDURE — 85610 PROTHROMBIN TIME: CPT

## 2023-06-20 PROCEDURE — 71046 X-RAY EXAM CHEST 2 VIEWS: CPT | Mod: 26

## 2023-06-20 PROCEDURE — 85730 THROMBOPLASTIN TIME PARTIAL: CPT

## 2023-06-20 PROCEDURE — 80053 COMPREHEN METABOLIC PANEL: CPT

## 2023-06-20 PROCEDURE — 87086 URINE CULTURE/COLONY COUNT: CPT

## 2023-06-20 PROCEDURE — 81003 URINALYSIS AUTO W/O SCOPE: CPT

## 2023-06-20 PROCEDURE — 99214 OFFICE O/P EST MOD 30 MIN: CPT | Mod: 25

## 2023-06-20 PROCEDURE — 36415 COLL VENOUS BLD VENIPUNCTURE: CPT

## 2023-06-20 RX ORDER — ATORVASTATIN CALCIUM 80 MG/1
1 TABLET, FILM COATED ORAL
Refills: 0 | DISCHARGE

## 2023-06-20 NOTE — H&P PST ADULT - NSICDXFAMILYHX_GEN_ALL_CORE_FT
FAMILY HISTORY:  Father  Still living? No  FH: cancer, Age at diagnosis: Age Unknown    Mother  Still living? No  FH: HTN (hypertension), Age at diagnosis: Age Unknown

## 2023-06-20 NOTE — H&P PST ADULT - NSICDXPASTSURGICALHX_GEN_ALL_CORE_FT
PAST SURGICAL HISTORY:  Atherosclerosis of CABG w oth angina pectoris 1982   x1    H/O arthroscopy right knee/ left knee    H/O arthroscopy of shoulder right  with bone spur repair    H/O carpal tunnel repair b/l    H/O knee surgery

## 2023-06-20 NOTE — H&P PST ADULT - HISTORY OF PRESENT ILLNESS
Patient is a 77 year old male presenting to PAST in preparation for  CYSTOSCOPY, UROLIFT on 6/27 under gen anesthesia by Dr. Kraft  Reports h/o frequent  urination , reports the prostate is enlarged and has been advised to have above  PATIENT CURRENTLY DENIES CHEST PAIN  SHORTNESS OF BREATH  PALPITATIONS,  DYSURIA, OR UPPER RESPIRATORY INFECTION IN PAST 2 WEEKS  EXERCISE  TOLERANCE  1-2 FLIGHT OF STAIRS  WITHOUT SHORTNESS OF BREATH    Anesthesia Alert  NO--Difficult Airway  NO--History of neck surgery or radiation  NO--Limited ROM of neck  NO--History of Malignant hyperthermia  NO--Personal or family history of Pseudocholinesterase deficiency  NO--Prior Anesthesia Complication  NO--Latex Allergy  NO--Loose teeth  NO--History of Rheumatoid Arthritis  NO--IBETH  NO-- BLEEDING RISK  NO--Other_____    As per patient, this is their complete medical and surgical history, including medications both prescribed or over the counter.  Patient verbalized understanding of instructions and was given the opportunity to ask questions and have them answered.

## 2023-06-21 DIAGNOSIS — Z01.818 ENCOUNTER FOR OTHER PREPROCEDURAL EXAMINATION: ICD-10-CM

## 2023-06-21 DIAGNOSIS — N40.1 BENIGN PROSTATIC HYPERPLASIA WITH LOWER URINARY TRACT SYMPTOMS: ICD-10-CM

## 2023-06-21 LAB
CULTURE RESULTS: SIGNIFICANT CHANGE UP
SPECIMEN SOURCE: SIGNIFICANT CHANGE UP

## 2023-06-23 ENCOUNTER — APPOINTMENT (OUTPATIENT)
Dept: CARDIOLOGY | Facility: CLINIC | Age: 78
End: 2023-06-23
Payer: COMMERCIAL

## 2023-06-23 VITALS
BODY MASS INDEX: 26.98 KG/M2 | HEIGHT: 68 IN | DIASTOLIC BLOOD PRESSURE: 80 MMHG | WEIGHT: 178 LBS | SYSTOLIC BLOOD PRESSURE: 160 MMHG | HEART RATE: 59 BPM

## 2023-06-23 DIAGNOSIS — Z01.810 ENCOUNTER FOR PREPROCEDURAL CARDIOVASCULAR EXAMINATION: ICD-10-CM

## 2023-06-23 PROCEDURE — 99214 OFFICE O/P EST MOD 30 MIN: CPT | Mod: 25

## 2023-06-23 PROCEDURE — 93000 ELECTROCARDIOGRAM COMPLETE: CPT

## 2023-06-23 NOTE — HISTORY OF PRESENT ILLNESS
[FreeTextEntry1] : Previously seen at [facility].\par \par 77 M with a history of CAD s/p 1v CABG, (4/1982), MI in 8/1993 (no intervention), HTN presents for risk stratification prior to cystoscopy and Urolift implant procedure.\par \par Denies any CP, SOB, palpitations, orthopnea/PND, dizziness or syncope.\par \par \par EKG (6/23/2023):  NSR, IWMI, no ST-T changes\par TTE (6/2022):  EF 45-50%, basal-mid inferolateral hypokinesis, Mild AI, Mild-mod MR\par Lexiscan MPI (5/2022):  Mid-distal inferior infarction (scar) consistent with known prior MI, mild LV dysfunction LVEF 45-50%, no evidence of stress-induced ischemia\par

## 2023-06-23 NOTE — ASSESSMENT
[FreeTextEntry1] : 77 M with CAD s/p 1v CABG, (4/1982), MI in 8/1993 (no intervention).\par Planned cystoscopy and Urolift implant procedure.\par \par METs >4\par RCRI = 1, 6% risk (30-day risk of death, MI, or cardiac arrest)\par Low-risk for low-risk procedure\par May hold aspirin for 7 days prior if necessary\par If BP remains elevated will increase Amlodipine to 10 mg\par \par No further cardiac workup is indicated at this time.  There are no current cardiac contraindications that prohibit proceeding with the scheduled surgery/procedure.  This consult serves only as a perioperative cardiac risk stratification and evaluation to predict 30-day cardiac complications risk and mortality.  The decision to proceed with the surgery/procedure is made by the performing physician and the patient.\par

## 2023-06-27 ENCOUNTER — TRANSCRIPTION ENCOUNTER (OUTPATIENT)
Age: 78
End: 2023-06-27

## 2023-06-27 ENCOUNTER — OUTPATIENT (OUTPATIENT)
Dept: INPATIENT UNIT | Facility: HOSPITAL | Age: 78
LOS: 1 days | Discharge: HOME | End: 2023-06-27
Payer: COMMERCIAL

## 2023-06-27 ENCOUNTER — APPOINTMENT (OUTPATIENT)
Dept: UROLOGY | Facility: HOSPITAL | Age: 78
End: 2023-06-27

## 2023-06-27 VITALS
OXYGEN SATURATION: 96 % | WEIGHT: 171.08 LBS | TEMPERATURE: 96 F | SYSTOLIC BLOOD PRESSURE: 180 MMHG | DIASTOLIC BLOOD PRESSURE: 82 MMHG | HEIGHT: 68 IN | RESPIRATION RATE: 17 BRPM | HEART RATE: 72 BPM

## 2023-06-27 VITALS — SYSTOLIC BLOOD PRESSURE: 180 MMHG | DIASTOLIC BLOOD PRESSURE: 71 MMHG | HEART RATE: 61 BPM | RESPIRATION RATE: 17 BRPM

## 2023-06-27 DIAGNOSIS — Z98.890 OTHER SPECIFIED POSTPROCEDURAL STATES: Chronic | ICD-10-CM

## 2023-06-27 DIAGNOSIS — N40.1 BENIGN PROSTATIC HYPERPLASIA WITH LOWER URINARY TRACT SYMPTOMS: ICD-10-CM

## 2023-06-27 DIAGNOSIS — I25.708 ATHEROSCLEROSIS OF CORONARY ARTERY BYPASS GRAFT(S), UNSPECIFIED, WITH OTHER FORMS OF ANGINA PECTORIS: Chronic | ICD-10-CM

## 2023-06-27 PROCEDURE — C1889: CPT

## 2023-06-27 PROCEDURE — 52442 CYSTO INS TRNSPRSTC IMPLT EA: CPT

## 2023-06-27 PROCEDURE — 52441 CYSTO INSJ TRNSPRSTC 1 IMPLT: CPT

## 2023-06-27 RX ORDER — OXYCODONE AND ACETAMINOPHEN 5; 325 MG/1; MG/1
1 TABLET ORAL EVERY 4 HOURS
Refills: 0 | Status: DISCONTINUED | OUTPATIENT
Start: 2023-06-27 | End: 2023-06-27

## 2023-06-27 RX ORDER — ATORVASTATIN CALCIUM 80 MG/1
1 TABLET, FILM COATED ORAL
Qty: 0 | Refills: 0 | DISCHARGE

## 2023-06-27 RX ORDER — MORPHINE SULFATE 50 MG/1
2 CAPSULE, EXTENDED RELEASE ORAL
Refills: 0 | Status: DISCONTINUED | OUTPATIENT
Start: 2023-06-27 | End: 2023-06-27

## 2023-06-27 RX ORDER — ACETAMINOPHEN 500 MG
2 TABLET ORAL
Qty: 40 | Refills: 0
Start: 2023-06-27 | End: 2023-07-01

## 2023-06-27 RX ORDER — FENOFIBRATE,MICRONIZED 130 MG
1 CAPSULE ORAL
Qty: 0 | Refills: 0 | DISCHARGE

## 2023-06-27 RX ORDER — PHENAZOPYRIDINE HCL 100 MG
2 TABLET ORAL
Qty: 8 | Refills: 0
Start: 2023-06-27 | End: 2023-06-28

## 2023-06-27 RX ORDER — ONDANSETRON 8 MG/1
4 TABLET, FILM COATED ORAL ONCE
Refills: 0 | Status: DISCONTINUED | OUTPATIENT
Start: 2023-06-27 | End: 2023-06-27

## 2023-06-27 RX ORDER — SODIUM CHLORIDE 9 MG/ML
1000 INJECTION, SOLUTION INTRAVENOUS
Refills: 0 | Status: DISCONTINUED | OUTPATIENT
Start: 2023-06-27 | End: 2023-06-27

## 2023-06-27 RX ORDER — LEVOTHYROXINE SODIUM 125 MCG
1 TABLET ORAL
Qty: 0 | Refills: 0 | DISCHARGE

## 2023-06-27 RX ORDER — TAMSULOSIN HYDROCHLORIDE 0.4 MG/1
1 CAPSULE ORAL
Qty: 0 | Refills: 0 | DISCHARGE

## 2023-06-27 NOTE — BRIEF OPERATIVE NOTE - NSICDXBRIEFPREOP_GEN_ALL_CORE_FT
PRE-OP DIAGNOSIS:  BPH with obstruction/lower urinary tract symptoms 27-Jun-2023 10:08:31  Christiano Kraft

## 2023-06-27 NOTE — BRIEF OPERATIVE NOTE - NSICDXBRIEFPOSTOP_GEN_ALL_CORE_FT
POST-OP DIAGNOSIS:  BPH with obstruction/lower urinary tract symptoms 27-Jun-2023 10:08:37  Christiano Kraft

## 2023-06-27 NOTE — BRIEF OPERATIVE NOTE - OPERATION/FINDINGS
urolift procedure  three implants at the proximal prostatic urethra were pull throughs/bone strikes  four at the level of the veru helped create a proper channel  there was hematuria so a whitehead catheter was left in place to be removed on friday

## 2023-06-27 NOTE — ASU PATIENT PROFILE, ADULT - FALL HARM RISK - HARM RISK INTERVENTIONS

## 2023-06-27 NOTE — ASU DISCHARGE PLAN (ADULT/PEDIATRIC) - COMMENTS
Patient to follow up at [address] [place] [address] on friday to remove whitehead catheter by nurse.  Thereafter will follow up in about 2 months for reassessment in office

## 2023-06-27 NOTE — BRIEF OPERATIVE NOTE - NSICDXBRIEFPROCEDURE_GEN_ALL_CORE_FT
PROCEDURES:  Insertion, retraction implant, prostate, cystoscopic, transurethral, for prostatic urethral lift 27-Jun-2023 10:08:22  Christiano Kraft A

## 2023-06-29 ENCOUNTER — NON-APPOINTMENT (OUTPATIENT)
Age: 78
End: 2023-06-29

## 2023-06-30 ENCOUNTER — APPOINTMENT (OUTPATIENT)
Dept: UROLOGY | Facility: CLINIC | Age: 78
End: 2023-06-30
Payer: COMMERCIAL

## 2023-06-30 ENCOUNTER — EMERGENCY (EMERGENCY)
Facility: HOSPITAL | Age: 78
LOS: 0 days | Discharge: HOME | End: 2023-06-30
Attending: STUDENT IN AN ORGANIZED HEALTH CARE EDUCATION/TRAINING PROGRAM
Payer: COMMERCIAL

## 2023-06-30 VITALS
SYSTOLIC BLOOD PRESSURE: 186 MMHG | WEIGHT: 171.08 LBS | HEIGHT: 68 IN | TEMPERATURE: 98 F | HEART RATE: 69 BPM | DIASTOLIC BLOOD PRESSURE: 84 MMHG | OXYGEN SATURATION: 95 %

## 2023-06-30 DIAGNOSIS — N13.8 OTHER OBSTRUCTIVE AND REFLUX UROPATHY: ICD-10-CM

## 2023-06-30 DIAGNOSIS — N40.1 BENIGN PROSTATIC HYPERPLASIA WITH LOWER URINARY TRACT SYMPTOMS: ICD-10-CM

## 2023-06-30 DIAGNOSIS — I25.10 ATHEROSCLEROTIC HEART DISEASE OF NATIVE CORONARY ARTERY WITHOUT ANGINA PECTORIS: ICD-10-CM

## 2023-06-30 DIAGNOSIS — Z98.890 OTHER SPECIFIED POSTPROCEDURAL STATES: Chronic | ICD-10-CM

## 2023-06-30 DIAGNOSIS — I25.2 OLD MYOCARDIAL INFARCTION: ICD-10-CM

## 2023-06-30 DIAGNOSIS — Z95.1 PRESENCE OF AORTOCORONARY BYPASS GRAFT: ICD-10-CM

## 2023-06-30 DIAGNOSIS — I10 ESSENTIAL (PRIMARY) HYPERTENSION: ICD-10-CM

## 2023-06-30 DIAGNOSIS — E03.9 HYPOTHYROIDISM, UNSPECIFIED: ICD-10-CM

## 2023-06-30 DIAGNOSIS — E78.00 PURE HYPERCHOLESTEROLEMIA, UNSPECIFIED: ICD-10-CM

## 2023-06-30 DIAGNOSIS — Z87.891 PERSONAL HISTORY OF NICOTINE DEPENDENCE: ICD-10-CM

## 2023-06-30 DIAGNOSIS — I25.708 ATHEROSCLEROSIS OF CORONARY ARTERY BYPASS GRAFT(S), UNSPECIFIED, WITH OTHER FORMS OF ANGINA PECTORIS: Chronic | ICD-10-CM

## 2023-06-30 DIAGNOSIS — M19.90 UNSPECIFIED OSTEOARTHRITIS, UNSPECIFIED SITE: ICD-10-CM

## 2023-06-30 PROCEDURE — 99284 EMERGENCY DEPT VISIT MOD MDM: CPT | Mod: 25

## 2023-06-30 PROCEDURE — 99285 EMERGENCY DEPT VISIT HI MDM: CPT

## 2023-06-30 PROCEDURE — 76770 US EXAM ABDO BACK WALL COMP: CPT

## 2023-06-30 PROCEDURE — 99213 OFFICE O/P EST LOW 20 MIN: CPT

## 2023-06-30 PROCEDURE — 76770 US EXAM ABDO BACK WALL COMP: CPT | Mod: 26

## 2023-06-30 RX ORDER — MORPHINE SULFATE 50 MG/1
4 CAPSULE, EXTENDED RELEASE ORAL ONCE
Refills: 0 | Status: DISCONTINUED | OUTPATIENT
Start: 2023-06-30 | End: 2023-06-30

## 2023-06-30 RX ORDER — SODIUM CHLORIDE 9 MG/ML
1000 INJECTION INTRAMUSCULAR; INTRAVENOUS; SUBCUTANEOUS ONCE
Refills: 0 | Status: DISCONTINUED | OUTPATIENT
Start: 2023-06-30 | End: 2023-06-30

## 2023-06-30 NOTE — ED PROVIDER NOTE - CLINICAL SUMMARY MEDICAL DECISION MAKING FREE TEXT BOX
77-year-old man history of hyperlipidemia hypothyroid BPH status post UroLift June 27 presenting here complaining of decreased urine output in his Whitehead bag since 12 PM and bleeding around the penile tip no fevers chills  vs reviewed, pocus demonstrated >500cc urine with whitehead in place, urology consulted irrigated whitehead and removed clot. Patient has an appointment with Dr. Kraft at 9:30am today. Return precautions provided.

## 2023-06-30 NOTE — ED ADULT NURSE NOTE - CAS ELECT INFOMATION PROVIDED
pt instructed to follow up with pmd in 1-3 days or return to ed for new or worsening symptoms/DC instructions Lab: 6

## 2023-06-30 NOTE — HISTORY OF PRESENT ILLNESS
[FreeTextEntry1] : urolift procedure in OR on June 27th \par proximal implant attempts were pull throughs/bone strikes x3\par i moved to the prostatic urethra at the veru where I was able to stack two implants on each side for a proper channel.\par there was hematuria so an 18F whitehead was left in place\par to be removed on friday and then patient to follow up in late august/early september for uroflow and PVR. \par \par pt went to the ER yesterday for clogged whitehead catheter\par today the bladder was irrigated and was relatively clear\par the whitehead was removed\par \par currently managed on dual therapy tamsulosin 0.4 mg daily and finasteride 5 mg daily. \par \par Patient also has erectile dysfunction managed on tadalafil 20mg -- more recently has been taking 5mg daily which has helped with both LUTS and erectile dysfunction (but not "rock hard")\par \par April 2022\par songoram -- prosate 52g with pvr 9ml\par \par Nov 2022\par sonogram -- prostate 51g with pvr 9ml\par \par March 2023\par Ct -- left renal cyst - no kidney stones \par \par June 2023\par cysto confirmed prostate obstruction with no protrusion into bladder

## 2023-06-30 NOTE — ED PROVIDER NOTE - PATIENT PORTAL LINK FT
You can access the FollowMyHealth Patient Portal offered by [facility] by registering at the following website: [email or web address]. By joining [employer]’s FollowMyHealth portal, you will also be able to view your health information using other applications (apps) compatible with our system.

## 2023-06-30 NOTE — ED ADULT TRIAGE NOTE - CHIEF COMPLAINT QUOTE
Pt had "urolift" on Tuesday and a Whitehead catheter was placed, . pt noted some blood coming from the tip of his penis, pt also denies any out put since about 12pm today.  pt stated whitehead was to be removed later today 6/30. Pt had "urolift" on Tuesday and a Whitehead catheter was placed, . pt noted some blood coming from the tip of his penis, stated he gets a burning sensation and then he feels it leaking from the tip of his penis, and the burning sensation subsides.  pt also denies any out put since about 12pm today.  "dark brown colored urine noted in whitehead bag, as well as leaking from his penis. pt stated whitehead was to be removed later today 6/30.

## 2023-06-30 NOTE — ED PROVIDER NOTE - CARE PROVIDER_API CALL
n/a Christiano Kraft  Urology  [address] [place] [id number]  Phone: [phone]  Fax: [phone]  Follow Up Time: 1-3 Days

## 2023-06-30 NOTE — ED ADULT NURSE NOTE - CHIEF COMPLAINT QUOTE
Pt had "urolift" on Tuesday and a Whitehead catheter was placed, . pt noted some blood coming from the tip of his penis, stated he gets a burning sensation and then he feels it leaking from the tip of his penis, and the burning sensation subsides.  pt also denies any out put since about 12pm today.  "dark brown colored urine noted in whitehead bag, as well as leaking from his penis. pt stated whitehead was to be removed later today 6/30.

## 2023-06-30 NOTE — ED PROVIDER NOTE - PROGRESS NOTE DETAILS
Authored by Dr. Magallanes: Bedside POCUS with over 500 cc of urine retention in bladder.  Patient is in significant pain.  Consulted urology.  Patient was found to have a blood clot at the neck of the Garcia which was removed, irrigated several times, and patient is now draining urine appropriately.  Patient felt immediate relief and is doing much better.  Patient has an appointment with Dr. Kraft later today at 9:30 in the morning.  Urology does not recommend any further testing as patient will be seen by his urologist later today. Patient no longer is complaining of pain and feels comfortable for discharge.

## 2023-06-30 NOTE — ED PROVIDER NOTE - PHYSICAL EXAMINATION
VITAL SIGNS: I have reviewed nursing notes and confirm.  CONSTITUTIONAL: non-toxic, + distress due to pain   SKIN: no rash, no petechiae.  EYES: pink conjunctiva, anicteric  ENT: tongue midline, no exudates, MMM  NECK: Supple; no meningismus  CARD: RRR, no murmurs, equal radial pulses bilaterally 2+  RESP: CTAB, no respiratory distress  ABD: Soft, non-tender, + mild abd distended, no peritoneal signs   (supervised by Dr Peguero): no hernias, no lesions, no testicular swelling, + whitehead in place w some minimal bloody urine outpt in whitehead bag with minimal bleeding at penile tip   NEURO: Alert, oriented. no focal deficits

## 2023-06-30 NOTE — ED PROVIDER NOTE - OBJECTIVE STATEMENT
77-year-old man w PMHx of hyperlipidemia hypothyroidism HTN CABG BPH status post UroLift procedure on June 27 w Urology Dr Kraft presenting here complaining of decreased urine output in his Whitehead bag since 12 PM. Pt reports when whitehead was initially placed he had good urine outpt. Pt described the urine as dark red and leaking around penile tip. Pt has the urgency to go but minimal output with pain at penis tip. Denies f/c CP SOB n/v/d abd pain. Of note pt has an apt later today w Dr Kraft and is due to have whitehead removed.

## 2023-06-30 NOTE — ED ADULT NURSE NOTE - NSFALLUNIVINTERV_ED_ALL_ED
Bed/Stretcher in lowest position, wheels locked, appropriate side rails in place/Call bell, personal items and telephone in reach/Instruct patient to call for assistance before getting out of bed/chair/stretcher/Non-slip footwear applied when patient is off stretcher/[place] to call system/Physically safe environment - no spills, clutter or unnecessary equipment/Purposeful proactive rounding/Room/bathroom lighting operational, light cord in reach

## 2023-07-04 DIAGNOSIS — R33.8 OTHER RETENTION OF URINE: ICD-10-CM

## 2023-07-04 DIAGNOSIS — T83.098A OTHER MECHANICAL COMPLICATION OF OTHER URINARY CATHETER, INITIAL ENCOUNTER: ICD-10-CM

## 2023-07-04 DIAGNOSIS — E03.9 HYPOTHYROIDISM, UNSPECIFIED: ICD-10-CM

## 2023-07-04 DIAGNOSIS — E78.5 HYPERLIPIDEMIA, UNSPECIFIED: ICD-10-CM
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
## 2023-07-04 DIAGNOSIS — N40.1 BENIGN PROSTATIC HYPERPLASIA WITH LOWER URINARY TRACT SYMPTOMS: ICD-10-CM

## 2023-07-04 DIAGNOSIS — I10 ESSENTIAL (PRIMARY) HYPERTENSION: ICD-10-CM

## 2023-07-04 DIAGNOSIS — I25.10 ATHEROSCLEROTIC HEART DISEASE OF NATIVE CORONARY ARTERY WITHOUT ANGINA PECTORIS: ICD-10-CM

## 2023-07-04 DIAGNOSIS — Z98.890 OTHER SPECIFIED POSTPROCEDURAL STATES: ICD-10-CM

## 2023-07-10 ENCOUNTER — NON-APPOINTMENT (OUTPATIENT)
Age: 78
End: 2023-07-10

## 2023-07-19 ENCOUNTER — APPOINTMENT (OUTPATIENT)
Dept: CARDIOLOGY | Facility: CLINIC | Age: 78
End: 2023-07-19
Payer: COMMERCIAL

## 2023-07-19 VITALS
SYSTOLIC BLOOD PRESSURE: 126 MMHG | HEART RATE: 65 BPM | DIASTOLIC BLOOD PRESSURE: 72 MMHG | HEIGHT: 68 IN | WEIGHT: 176 LBS | BODY MASS INDEX: 26.67 KG/M2

## 2023-07-19 PROCEDURE — 93000 ELECTROCARDIOGRAM COMPLETE: CPT

## 2023-07-19 PROCEDURE — 99214 OFFICE O/P EST MOD 30 MIN: CPT | Mod: 25

## 2023-07-19 NOTE — ASSESSMENT
[FreeTextEntry1] : 77 M with CAD s/p 1v CABG, (4/1982), MI in 8/1993 (no intervention).\par Planned eye surgery.\par \par METs >4\par RCRI = 1, 6% risk (30-day risk of death, MI, or cardiac arrest)\par Low-risk for low-risk procedure\par May hold aspirin for 7 days prior if necessary\par \par No further cardiac workup is indicated at this time.  There are no current cardiac contraindications that prohibit proceeding with the scheduled surgery/procedure.  This consult serves only as a perioperative cardiac risk stratification and evaluation to predict 30-day cardiac complications risk and mortality.  The decision to proceed with the surgery/procedure is made by the performing physician and the patient.\par

## 2023-07-19 NOTE — REVIEW OF SYSTEMS
[Negative] : Heme/Lymph [FreeTextEntry5] : See HPI [FreeTextEntry6] : See HPI [FreeTextEntry8] : See HPI [FreeTextEntry9] : +arthritis

## 2023-07-19 NOTE — HISTORY OF PRESENT ILLNESS
[FreeTextEntry1] : Previously seen at [facility].\par \par 77 M with a history of CAD s/p 1v CABG, (4/1982), MI in 8/1993 (no intervention), HTN, s/p cystoscopy and Urolift implant procedure presents for risk stratification prior to eye surgery.\par \par Denies any CP, SOB, palpitations, orthopnea/PND, dizziness or syncope.\par \par \par EKG (6/23/2023):  NSR, IWMI, no ST-T changes\par TTE (6/2022):  EF 45-50%, basal-mid inferolateral hypokinesis, Mild AI, Mild-mod MR\par Lexiscan MPI (5/2022):  Mid-distal inferior infarction (scar) consistent with known prior MI, mild LV dysfunction LVEF 45-50%, no evidence of stress-induced ischemia\par

## 2023-08-30 ENCOUNTER — APPOINTMENT (OUTPATIENT)
Dept: UROLOGY | Facility: CLINIC | Age: 78
End: 2023-08-30
Payer: COMMERCIAL

## 2023-08-30 DIAGNOSIS — R35.0 FREQUENCY OF MICTURITION: ICD-10-CM

## 2023-08-30 PROCEDURE — 99213 OFFICE O/P EST LOW 20 MIN: CPT

## 2023-08-30 NOTE — HISTORY OF PRESENT ILLNESS
[FreeTextEntry1] : urolift procedure in OR on June 27th  -- overall has improved -- Qmax is good at 20 and pvr is 21 proximal implant attempts were pull throughs/bone strikes x3 i moved to the prostatic urethra at the veru where I was able to stack two implants on each side for a proper channel.  he has already stopped flomax and finasterde -- already has improved symptoms though some urgency remains  Patient also has erectile dysfunction managed on tadalafil 20mg -- more recently has been taking 5mg daily which has helped with both LUTS and erectile dysfunction (but not "rock hard")  April 2022 sonogram -- prostate 52g with pvr 9ml  Nov 2022 sonogram -- prostate 51g with pvr 9ml  March 2023 Ct -- left renal cyst - no kidney stones  June 2023 cysto confirmed prostate obstruction with no protrusion into bladder.       Aug 2023 uroflow -- pvr 21ml, Q max = 20.8ml.s

## 2023-08-30 NOTE — ASSESSMENT
[FreeTextEntry1] : urolift procedure in OR on June 27th  -- overall has improved -- Qmax is good at 20 and pvr is 21 proximal implant attempts were pull throughs/bone strikes x3 i moved to the prostatic urethra at the veru where I was able to stack two implants on each side for a proper channel.  he has already stopped flomax and finasterde -- already has improved symptoms though some urgency remains  Patient also has erectile dysfunction managed on tadalafil 20mg -- more recently has been taking 5mg daily which has helped with both LUTS and erectile dysfunction (but not "rock hard")  April 2022 sonogram -- prostate 52g with pvr 9ml  Nov 2022 sonogram -- prostate 51g with pvr 9ml  March 2023 Ct -- left renal cyst - no kidney stones  June 2023 cysto confirmed prostate obstruction with no protrusion into bladder.    Aug 2023 uroflow -- pvr 21ml, Q max = 20.8ml.s  Plan -- uroflow and pvr in 2 months continue with daily tadalafil

## 2023-09-08 ENCOUNTER — APPOINTMENT (OUTPATIENT)
Dept: UROLOGY | Facility: CLINIC | Age: 78
End: 2023-09-08

## 2023-09-13 ENCOUNTER — APPOINTMENT (OUTPATIENT)
Dept: ORTHOPEDIC SURGERY | Facility: CLINIC | Age: 78
End: 2023-09-13
Payer: COMMERCIAL

## 2023-09-13 DIAGNOSIS — Z96.652 PRESENCE OF LEFT ARTIFICIAL KNEE JOINT: ICD-10-CM

## 2023-09-13 PROCEDURE — 99212 OFFICE O/P EST SF 10 MIN: CPT

## 2023-09-13 PROCEDURE — 73562 X-RAY EXAM OF KNEE 3: CPT | Mod: RT

## 2023-11-08 ENCOUNTER — APPOINTMENT (OUTPATIENT)
Dept: UROLOGY | Facility: CLINIC | Age: 78
End: 2023-11-08
Payer: COMMERCIAL

## 2023-11-08 VITALS
TEMPERATURE: 98 F | BODY MASS INDEX: 25.76 KG/M2 | DIASTOLIC BLOOD PRESSURE: 66 MMHG | HEIGHT: 68 IN | SYSTOLIC BLOOD PRESSURE: 143 MMHG | WEIGHT: 170 LBS | HEART RATE: 64 BPM

## 2023-11-08 PROCEDURE — 99213 OFFICE O/P EST LOW 20 MIN: CPT

## 2023-11-24 ENCOUNTER — INPATIENT (INPATIENT)
Facility: HOSPITAL | Age: 78
LOS: 1 days | Discharge: HOME | DRG: 291 | End: 2023-11-26
Attending: STUDENT IN AN ORGANIZED HEALTH CARE EDUCATION/TRAINING PROGRAM | Admitting: STUDENT IN AN ORGANIZED HEALTH CARE EDUCATION/TRAINING PROGRAM
Payer: COMMERCIAL

## 2023-11-24 VITALS
OXYGEN SATURATION: 96 % | WEIGHT: 169.98 LBS | DIASTOLIC BLOOD PRESSURE: 84 MMHG | HEART RATE: 74 BPM | TEMPERATURE: 98 F | RESPIRATION RATE: 20 BRPM | SYSTOLIC BLOOD PRESSURE: 166 MMHG | HEIGHT: 67 IN

## 2023-11-24 DIAGNOSIS — Z98.890 OTHER SPECIFIED POSTPROCEDURAL STATES: Chronic | ICD-10-CM

## 2023-11-24 DIAGNOSIS — I25.10 ATHEROSCLEROTIC HEART DISEASE OF NATIVE CORONARY ARTERY WITHOUT ANGINA PECTORIS: ICD-10-CM

## 2023-11-24 DIAGNOSIS — E03.9 HYPOTHYROIDISM, UNSPECIFIED: ICD-10-CM

## 2023-11-24 DIAGNOSIS — I25.708 ATHEROSCLEROSIS OF CORONARY ARTERY BYPASS GRAFT(S), UNSPECIFIED, WITH OTHER FORMS OF ANGINA PECTORIS: Chronic | ICD-10-CM

## 2023-11-24 DIAGNOSIS — Z95.1 PRESENCE OF AORTOCORONARY BYPASS GRAFT: ICD-10-CM

## 2023-11-24 DIAGNOSIS — I50.9 HEART FAILURE, UNSPECIFIED: ICD-10-CM

## 2023-11-24 DIAGNOSIS — I16.0 HYPERTENSIVE URGENCY: ICD-10-CM

## 2023-11-24 DIAGNOSIS — I50.31 ACUTE DIASTOLIC (CONGESTIVE) HEART FAILURE: ICD-10-CM

## 2023-11-24 DIAGNOSIS — I25.2 OLD MYOCARDIAL INFARCTION: ICD-10-CM

## 2023-11-24 DIAGNOSIS — E78.00 PURE HYPERCHOLESTEROLEMIA, UNSPECIFIED: ICD-10-CM

## 2023-11-24 DIAGNOSIS — R94.31 ABNORMAL ELECTROCARDIOGRAM [ECG] [EKG]: ICD-10-CM

## 2023-11-24 DIAGNOSIS — N40.0 BENIGN PROSTATIC HYPERPLASIA WITHOUT LOWER URINARY TRACT SYMPTOMS: ICD-10-CM

## 2023-11-24 DIAGNOSIS — I11.0 HYPERTENSIVE HEART DISEASE WITH HEART FAILURE: ICD-10-CM

## 2023-11-24 DIAGNOSIS — Z79.890 HORMONE REPLACEMENT THERAPY: ICD-10-CM

## 2023-11-24 DIAGNOSIS — Z87.891 PERSONAL HISTORY OF NICOTINE DEPENDENCE: ICD-10-CM

## 2023-11-24 DIAGNOSIS — R05.9 COUGH, UNSPECIFIED: ICD-10-CM

## 2023-11-24 LAB
ALBUMIN SERPL ELPH-MCNC: 4.9 G/DL — SIGNIFICANT CHANGE UP (ref 3.5–5.2)
ALBUMIN SERPL ELPH-MCNC: 4.9 G/DL — SIGNIFICANT CHANGE UP (ref 3.5–5.2)
ALP SERPL-CCNC: 65 U/L — SIGNIFICANT CHANGE UP (ref 30–115)
ALP SERPL-CCNC: 65 U/L — SIGNIFICANT CHANGE UP (ref 30–115)
ALT FLD-CCNC: 46 U/L — HIGH (ref 0–41)
ALT FLD-CCNC: 46 U/L — HIGH (ref 0–41)
ANION GAP SERPL CALC-SCNC: 12 MMOL/L — SIGNIFICANT CHANGE UP (ref 7–14)
ANION GAP SERPL CALC-SCNC: 12 MMOL/L — SIGNIFICANT CHANGE UP (ref 7–14)
AST SERPL-CCNC: 55 U/L — HIGH (ref 0–41)
AST SERPL-CCNC: 55 U/L — HIGH (ref 0–41)
BASE EXCESS BLDV CALC-SCNC: 2.2 MMOL/L — SIGNIFICANT CHANGE UP (ref -2–3)
BASE EXCESS BLDV CALC-SCNC: 2.2 MMOL/L — SIGNIFICANT CHANGE UP (ref -2–3)
BASOPHILS # BLD AUTO: 0.02 K/UL — SIGNIFICANT CHANGE UP (ref 0–0.2)
BASOPHILS # BLD AUTO: 0.02 K/UL — SIGNIFICANT CHANGE UP (ref 0–0.2)
BASOPHILS NFR BLD AUTO: 0.3 % — SIGNIFICANT CHANGE UP (ref 0–1)
BASOPHILS NFR BLD AUTO: 0.3 % — SIGNIFICANT CHANGE UP (ref 0–1)
BILIRUB SERPL-MCNC: 1.1 MG/DL — SIGNIFICANT CHANGE UP (ref 0.2–1.2)
BILIRUB SERPL-MCNC: 1.1 MG/DL — SIGNIFICANT CHANGE UP (ref 0.2–1.2)
BUN SERPL-MCNC: 31 MG/DL — HIGH (ref 10–20)
BUN SERPL-MCNC: 31 MG/DL — HIGH (ref 10–20)
CA-I SERPL-SCNC: 1.2 MMOL/L — SIGNIFICANT CHANGE UP (ref 1.15–1.33)
CA-I SERPL-SCNC: 1.2 MMOL/L — SIGNIFICANT CHANGE UP (ref 1.15–1.33)
CALCIUM SERPL-MCNC: 9.9 MG/DL — SIGNIFICANT CHANGE UP (ref 8.4–10.4)
CALCIUM SERPL-MCNC: 9.9 MG/DL — SIGNIFICANT CHANGE UP (ref 8.4–10.4)
CHLORIDE SERPL-SCNC: 104 MMOL/L — SIGNIFICANT CHANGE UP (ref 98–110)
CHLORIDE SERPL-SCNC: 104 MMOL/L — SIGNIFICANT CHANGE UP (ref 98–110)
CO2 SERPL-SCNC: 25 MMOL/L — SIGNIFICANT CHANGE UP (ref 17–32)
CO2 SERPL-SCNC: 25 MMOL/L — SIGNIFICANT CHANGE UP (ref 17–32)
CREAT SERPL-MCNC: 1 MG/DL — SIGNIFICANT CHANGE UP (ref 0.7–1.5)
CREAT SERPL-MCNC: 1 MG/DL — SIGNIFICANT CHANGE UP (ref 0.7–1.5)
EGFR: 78 ML/MIN/1.73M2 — SIGNIFICANT CHANGE UP
EGFR: 78 ML/MIN/1.73M2 — SIGNIFICANT CHANGE UP
EOSINOPHIL # BLD AUTO: 0.01 K/UL — SIGNIFICANT CHANGE UP (ref 0–0.7)
EOSINOPHIL # BLD AUTO: 0.01 K/UL — SIGNIFICANT CHANGE UP (ref 0–0.7)
EOSINOPHIL NFR BLD AUTO: 0.2 % — SIGNIFICANT CHANGE UP (ref 0–8)
EOSINOPHIL NFR BLD AUTO: 0.2 % — SIGNIFICANT CHANGE UP (ref 0–8)
FLUAV AG NPH QL: SIGNIFICANT CHANGE UP
FLUAV AG NPH QL: SIGNIFICANT CHANGE UP
FLUBV AG NPH QL: SIGNIFICANT CHANGE UP
FLUBV AG NPH QL: SIGNIFICANT CHANGE UP
GAS PNL BLDV: 135 MMOL/L — LOW (ref 136–145)
GAS PNL BLDV: 135 MMOL/L — LOW (ref 136–145)
GAS PNL BLDV: SIGNIFICANT CHANGE UP
GAS PNL BLDV: SIGNIFICANT CHANGE UP
GLUCOSE SERPL-MCNC: 112 MG/DL — HIGH (ref 70–99)
GLUCOSE SERPL-MCNC: 112 MG/DL — HIGH (ref 70–99)
HCO3 BLDV-SCNC: 28 MMOL/L — SIGNIFICANT CHANGE UP (ref 22–29)
HCO3 BLDV-SCNC: 28 MMOL/L — SIGNIFICANT CHANGE UP (ref 22–29)
HCT VFR BLD CALC: 42.4 % — SIGNIFICANT CHANGE UP (ref 42–52)
HCT VFR BLD CALC: 42.4 % — SIGNIFICANT CHANGE UP (ref 42–52)
HCT VFR BLDA CALC: 43 % — SIGNIFICANT CHANGE UP (ref 39–51)
HCT VFR BLDA CALC: 43 % — SIGNIFICANT CHANGE UP (ref 39–51)
HGB BLD CALC-MCNC: 14.2 G/DL — SIGNIFICANT CHANGE UP (ref 12.6–17.4)
HGB BLD CALC-MCNC: 14.2 G/DL — SIGNIFICANT CHANGE UP (ref 12.6–17.4)
HGB BLD-MCNC: 14.3 G/DL — SIGNIFICANT CHANGE UP (ref 14–18)
HGB BLD-MCNC: 14.3 G/DL — SIGNIFICANT CHANGE UP (ref 14–18)
IMM GRANULOCYTES NFR BLD AUTO: 0.3 % — SIGNIFICANT CHANGE UP (ref 0.1–0.3)
IMM GRANULOCYTES NFR BLD AUTO: 0.3 % — SIGNIFICANT CHANGE UP (ref 0.1–0.3)
LACTATE BLDV-MCNC: 1.6 MMOL/L — SIGNIFICANT CHANGE UP (ref 0.5–2)
LACTATE BLDV-MCNC: 1.6 MMOL/L — SIGNIFICANT CHANGE UP (ref 0.5–2)
LYMPHOCYTES # BLD AUTO: 0.67 K/UL — LOW (ref 1.2–3.4)
LYMPHOCYTES # BLD AUTO: 0.67 K/UL — LOW (ref 1.2–3.4)
LYMPHOCYTES # BLD AUTO: 10.2 % — LOW (ref 20.5–51.1)
LYMPHOCYTES # BLD AUTO: 10.2 % — LOW (ref 20.5–51.1)
MCHC RBC-ENTMCNC: 31.8 PG — HIGH (ref 27–31)
MCHC RBC-ENTMCNC: 31.8 PG — HIGH (ref 27–31)
MCHC RBC-ENTMCNC: 33.7 G/DL — SIGNIFICANT CHANGE UP (ref 32–37)
MCHC RBC-ENTMCNC: 33.7 G/DL — SIGNIFICANT CHANGE UP (ref 32–37)
MCV RBC AUTO: 94.2 FL — HIGH (ref 80–94)
MCV RBC AUTO: 94.2 FL — HIGH (ref 80–94)
MONOCYTES # BLD AUTO: 0.54 K/UL — SIGNIFICANT CHANGE UP (ref 0.1–0.6)
MONOCYTES # BLD AUTO: 0.54 K/UL — SIGNIFICANT CHANGE UP (ref 0.1–0.6)
MONOCYTES NFR BLD AUTO: 8.2 % — SIGNIFICANT CHANGE UP (ref 1.7–9.3)
MONOCYTES NFR BLD AUTO: 8.2 % — SIGNIFICANT CHANGE UP (ref 1.7–9.3)
NEUTROPHILS # BLD AUTO: 5.32 K/UL — SIGNIFICANT CHANGE UP (ref 1.4–6.5)
NEUTROPHILS # BLD AUTO: 5.32 K/UL — SIGNIFICANT CHANGE UP (ref 1.4–6.5)
NEUTROPHILS NFR BLD AUTO: 80.8 % — HIGH (ref 42.2–75.2)
NEUTROPHILS NFR BLD AUTO: 80.8 % — HIGH (ref 42.2–75.2)
NRBC # BLD: 0 /100 WBCS — SIGNIFICANT CHANGE UP (ref 0–0)
NRBC # BLD: 0 /100 WBCS — SIGNIFICANT CHANGE UP (ref 0–0)
NT-PROBNP SERPL-SCNC: 4610 PG/ML — HIGH (ref 0–300)
NT-PROBNP SERPL-SCNC: 4610 PG/ML — HIGH (ref 0–300)
PCO2 BLDV: 46 MMHG — SIGNIFICANT CHANGE UP (ref 42–55)
PCO2 BLDV: 46 MMHG — SIGNIFICANT CHANGE UP (ref 42–55)
PH BLDV: 7.39 — SIGNIFICANT CHANGE UP (ref 7.32–7.43)
PH BLDV: 7.39 — SIGNIFICANT CHANGE UP (ref 7.32–7.43)
PLATELET # BLD AUTO: 169 K/UL — SIGNIFICANT CHANGE UP (ref 130–400)
PLATELET # BLD AUTO: 169 K/UL — SIGNIFICANT CHANGE UP (ref 130–400)
PMV BLD: 11.6 FL — HIGH (ref 7.4–10.4)
PMV BLD: 11.6 FL — HIGH (ref 7.4–10.4)
PO2 BLDV: 33 MMHG — SIGNIFICANT CHANGE UP
PO2 BLDV: 33 MMHG — SIGNIFICANT CHANGE UP
POTASSIUM BLDV-SCNC: 4.3 MMOL/L — SIGNIFICANT CHANGE UP (ref 3.5–5.1)
POTASSIUM BLDV-SCNC: 4.3 MMOL/L — SIGNIFICANT CHANGE UP (ref 3.5–5.1)
POTASSIUM SERPL-MCNC: 4.2 MMOL/L — SIGNIFICANT CHANGE UP (ref 3.5–5)
POTASSIUM SERPL-MCNC: 4.2 MMOL/L — SIGNIFICANT CHANGE UP (ref 3.5–5)
POTASSIUM SERPL-SCNC: 4.2 MMOL/L — SIGNIFICANT CHANGE UP (ref 3.5–5)
POTASSIUM SERPL-SCNC: 4.2 MMOL/L — SIGNIFICANT CHANGE UP (ref 3.5–5)
PROT SERPL-MCNC: 6.8 G/DL — SIGNIFICANT CHANGE UP (ref 6–8)
PROT SERPL-MCNC: 6.8 G/DL — SIGNIFICANT CHANGE UP (ref 6–8)
RBC # BLD: 4.5 M/UL — LOW (ref 4.7–6.1)
RBC # BLD: 4.5 M/UL — LOW (ref 4.7–6.1)
RBC # FLD: 12.6 % — SIGNIFICANT CHANGE UP (ref 11.5–14.5)
RBC # FLD: 12.6 % — SIGNIFICANT CHANGE UP (ref 11.5–14.5)
RSV RNA NPH QL NAA+NON-PROBE: SIGNIFICANT CHANGE UP
RSV RNA NPH QL NAA+NON-PROBE: SIGNIFICANT CHANGE UP
SAO2 % BLDV: 46.9 % — SIGNIFICANT CHANGE UP
SAO2 % BLDV: 46.9 % — SIGNIFICANT CHANGE UP
SARS-COV-2 RNA SPEC QL NAA+PROBE: SIGNIFICANT CHANGE UP
SARS-COV-2 RNA SPEC QL NAA+PROBE: SIGNIFICANT CHANGE UP
SODIUM SERPL-SCNC: 141 MMOL/L — SIGNIFICANT CHANGE UP (ref 135–146)
SODIUM SERPL-SCNC: 141 MMOL/L — SIGNIFICANT CHANGE UP (ref 135–146)
TROPONIN T SERPL-MCNC: 0.01 NG/ML — SIGNIFICANT CHANGE UP
TROPONIN T SERPL-MCNC: 0.01 NG/ML — SIGNIFICANT CHANGE UP
TROPONIN T SERPL-MCNC: <0.01 NG/ML — SIGNIFICANT CHANGE UP
TROPONIN T SERPL-MCNC: <0.01 NG/ML — SIGNIFICANT CHANGE UP
WBC # BLD: 6.58 K/UL — SIGNIFICANT CHANGE UP (ref 4.8–10.8)
WBC # BLD: 6.58 K/UL — SIGNIFICANT CHANGE UP (ref 4.8–10.8)
WBC # FLD AUTO: 6.58 K/UL — SIGNIFICANT CHANGE UP (ref 4.8–10.8)
WBC # FLD AUTO: 6.58 K/UL — SIGNIFICANT CHANGE UP (ref 4.8–10.8)

## 2023-11-24 PROCEDURE — 84484 ASSAY OF TROPONIN QUANT: CPT

## 2023-11-24 PROCEDURE — 93010 ELECTROCARDIOGRAM REPORT: CPT

## 2023-11-24 PROCEDURE — 99285 EMERGENCY DEPT VISIT HI MDM: CPT | Mod: GC

## 2023-11-24 PROCEDURE — 99223 1ST HOSP IP/OBS HIGH 75: CPT

## 2023-11-24 PROCEDURE — 85027 COMPLETE CBC AUTOMATED: CPT

## 2023-11-24 PROCEDURE — 0225U NFCT DS DNA&RNA 21 SARSCOV2: CPT

## 2023-11-24 PROCEDURE — 93306 TTE W/DOPPLER COMPLETE: CPT

## 2023-11-24 PROCEDURE — 82088 ASSAY OF ALDOSTERONE: CPT

## 2023-11-24 PROCEDURE — 83735 ASSAY OF MAGNESIUM: CPT

## 2023-11-24 PROCEDURE — 36415 COLL VENOUS BLD VENIPUNCTURE: CPT

## 2023-11-24 PROCEDURE — 93005 ELECTROCARDIOGRAM TRACING: CPT

## 2023-11-24 PROCEDURE — 71045 X-RAY EXAM CHEST 1 VIEW: CPT | Mod: 26

## 2023-11-24 PROCEDURE — 84244 ASSAY OF RENIN: CPT

## 2023-11-24 PROCEDURE — 83880 ASSAY OF NATRIURETIC PEPTIDE: CPT

## 2023-11-24 PROCEDURE — 80048 BASIC METABOLIC PNL TOTAL CA: CPT

## 2023-11-24 RX ORDER — LEVOTHYROXINE SODIUM 125 MCG
25 TABLET ORAL DAILY
Refills: 0 | Status: DISCONTINUED | OUTPATIENT
Start: 2023-11-24 | End: 2023-11-26

## 2023-11-24 RX ORDER — TAMSULOSIN HYDROCHLORIDE 0.4 MG/1
0.4 CAPSULE ORAL AT BEDTIME
Refills: 0 | Status: DISCONTINUED | OUTPATIENT
Start: 2023-11-24 | End: 2023-11-26

## 2023-11-24 RX ORDER — FUROSEMIDE 40 MG
20 TABLET ORAL
Refills: 0 | Status: DISCONTINUED | OUTPATIENT
Start: 2023-11-25 | End: 2023-11-26

## 2023-11-24 RX ORDER — ENOXAPARIN SODIUM 100 MG/ML
40 INJECTION SUBCUTANEOUS EVERY 24 HOURS
Refills: 0 | Status: DISCONTINUED | OUTPATIENT
Start: 2023-11-24 | End: 2023-11-26

## 2023-11-24 RX ORDER — FUROSEMIDE 40 MG
20 TABLET ORAL ONCE
Refills: 0 | Status: COMPLETED | OUTPATIENT
Start: 2023-11-24 | End: 2023-11-24

## 2023-11-24 RX ORDER — FINASTERIDE 5 MG/1
1 TABLET, FILM COATED ORAL
Qty: 0 | Refills: 0 | DISCHARGE

## 2023-11-24 RX ORDER — ENOXAPARIN SODIUM 100 MG/ML
30 INJECTION SUBCUTANEOUS EVERY 12 HOURS
Refills: 0 | Status: DISCONTINUED | OUTPATIENT
Start: 2023-11-24 | End: 2023-11-24

## 2023-11-24 RX ORDER — ASPIRIN/CALCIUM CARB/MAGNESIUM 324 MG
81 TABLET ORAL DAILY
Refills: 0 | Status: DISCONTINUED | OUTPATIENT
Start: 2023-11-24 | End: 2023-11-26

## 2023-11-24 RX ORDER — FENOFIBRATE,MICRONIZED 130 MG
145 CAPSULE ORAL DAILY
Refills: 0 | Status: DISCONTINUED | OUTPATIENT
Start: 2023-11-24 | End: 2023-11-26

## 2023-11-24 RX ORDER — ATORVASTATIN CALCIUM 80 MG/1
20 TABLET, FILM COATED ORAL AT BEDTIME
Refills: 0 | Status: DISCONTINUED | OUTPATIENT
Start: 2023-11-24 | End: 2023-11-26

## 2023-11-24 RX ORDER — ATENOLOL 25 MG/1
1 TABLET ORAL
Qty: 0 | Refills: 0 | DISCHARGE

## 2023-11-24 RX ORDER — AMLODIPINE BESYLATE 2.5 MG/1
5 TABLET ORAL DAILY
Refills: 0 | Status: DISCONTINUED | OUTPATIENT
Start: 2023-11-24 | End: 2023-11-25

## 2023-11-24 RX ORDER — ATENOLOL 25 MG/1
100 TABLET ORAL DAILY
Refills: 0 | Status: DISCONTINUED | OUTPATIENT
Start: 2023-11-24 | End: 2023-11-25

## 2023-11-24 RX ADMIN — Medication 20 MILLIGRAM(S): at 19:03

## 2023-11-24 RX ADMIN — Medication 100 MILLIGRAM(S): at 22:50

## 2023-11-24 RX ADMIN — Medication 20 MILLIGRAM(S): at 16:28

## 2023-11-24 RX ADMIN — ENOXAPARIN SODIUM 40 MILLIGRAM(S): 100 INJECTION SUBCUTANEOUS at 22:41

## 2023-11-24 NOTE — PATIENT PROFILE ADULT - FALL HARM RISK - HARM RISK INTERVENTIONS

## 2023-11-24 NOTE — ED PROVIDER NOTE - CLINICAL SUMMARY MEDICAL DECISION MAKING FREE TEXT BOX
78-year-old male history hypertension dyslipidemia hypothyroidism CAD PCI presenting for evaluation of cough congestion shortness of breath, dyspnea on exertion getting progressively worse.  Mild associated chest pain.  No lower extremity pain or swelling.  Well appearing, NAD, non toxic. NCAT PERRLA EOMI neck supple non tender normal wob scattered crackles rrr abdomen s nt nd no rebound no guarding WWPx4 neuro non focal.  Labs EKG imaging reviewed.  Patient with new ST depressions inferior leads.  B-lines noted on bedside ultrasound.  Patient diuresed.  Discussed with cardiology, will admit for further evaluation.

## 2023-11-24 NOTE — ED PROVIDER NOTE - PROGRESS NOTE DETAILS
pk: bedside sono demonstrates dec ef and bilateral b lines, cxr shows effusion, labs reviewed elevated bnp, lasix given pt admitted to cardio tele for new onset chf.

## 2023-11-24 NOTE — H&P ADULT - NSHPPHYSICALEXAM_GEN_ALL_CORE
VITALS:   T(C): 36.5 (11-24-23 @ 13:46), Max: 36.5 (11-24-23 @ 13:46)  HR: 74 (11-24-23 @ 13:46) (74 - 74)  BP: 166/84 (11-24-23 @ 13:46) (166/84 - 166/84)  RR: 20 (11-24-23 @ 13:46) (20 - 20)  SpO2: 96% (11-24-23 @ 13:46) (96% - 96%)    GENERAL: NAD, lying in bed comfortably  HEAD:  Atraumatic, normocephalic  EYES: EOMI, PERRLA, conjunctiva and sclera clear  ENT: Moist mucous membranes  NECK: Supple, no JVD  HEART: Regular rate and rhythm, no murmurs, rubs, or gallops  LUNGS: Unlabored respirations.  No wheezing or rhonchi. Diminished BS in Bilateral bases.  ABDOMEN: Soft, nontender, nondistended, +BS  EXTREMITIES: 2+ peripheral pulses bilaterally. No clubbing, cyanosis. Mild 1+ pitting edema bilateral LE.  NERVOUS SYSTEM:  A&Ox3, no focal deficits.  SKIN: No rashes or lesions

## 2023-11-24 NOTE — H&P ADULT - NS ATTEND AMEND GEN_ALL_CORE FT
77yoM with CAD s/p CABG (in 4/1982 in the setting of substernal burning with minimal ambulation), MI (in 8/1993, medically managed), and HTN who presented with 1 week of worsening dyspnea on exertion.     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 Says he was congested and couldn't breath through his nose, associated with a dry cough. Over the past week, he has noted increasing dyspnea with walking short distances. This morning, we was unable to walk from his car to his front door and he also became acutely dyspneic when showing. Denies orthopnea, PND, or DWAYNE. Endorses abdominal distention.     In the ED, HR 74 and /84. Lab work notable fot AST/ALT 55/46, troponin negative, and BNP 4610. ECG with sinus rhythm, 67 bpm, and new TWI in III and aVF. Repeat ECG stable. CXR with small pleural effusions.     Plan:   - Treat for possible volume overload with Lasix 20 mg IV BID  - Treat for hypertensive urgency, resume all home medications and will adjust  - Consider worsening CAD if symptoms do not improve with diuresis and BP control  - Trend troponin and ECG  - Check RVP  - TTE ordered

## 2023-11-24 NOTE — H&P ADULT - HISTORY OF PRESENT ILLNESS
The patient is a 77 year old male with a history of CAD s/p 1v CABG, (4/1982), MI in 8/1993 (no intervention), HTN, s/p cystoscopy and Urolift implant procedure  The patient is a 77 year old male former smoker with a history of CAD s/p 1v CABG, (4/1982), MI in 8/1993 (no intervention), HTN, bilateral knee surgeries, s/p cystoscopy and Urolift implant procedure who presents from an urgent care with worsening SOB and a dry cough x1 week. The patient stated his SOB worsens with ambulation and improves with rest. Patient was able to lift heavy objects and climb three flights of stairs without feeling winded. Now, he was unable to walk from his front door to his car without feeling SOB. Patient states that this morning he was in the shower and had to get out and get fresh air to catch his breath. Patient admits to nausea after rapid breathing that subsided. The patient  also admits to stomach bloating and loose stools from Magnesium supplement. The patient denies CP, orthopnea, dizziness, and syncope.    In the ED, patient's EKG showed new inferior T wave inversions. Chest X-ray showed bilateral opacities. Patient received Lasix 20mg IVP.   The patient is a 77 year old male former smoker with a history of CAD s/p 1v CABG, (4/1982), MI in 8/1993 (no intervention), HTN, bilateral knee surgeries, s/p cystoscopy and Urolift implant procedure who presents from an urgent care with worsening SOB and a dry cough x1 week. The patient stated his SOB worsens with ambulation and improves with rest. Patient was able to lift heavy objects and climb three flights of stairs without feeling winded. Now, he was unable to walk from his front door to his car without feeling SOB. Patient states that this morning he was in the shower and had to get out and get fresh air to catch his breath. Patient admits to nausea after rapid breathing that subsided. The patient  also admits to stomach bloating and loose stools from Magnesium supplement. The patient denies CP, orthopnea, dizziness, and syncope.    In the ED, patient's EKG showed new inferior T wave inversions. Chest X-ray showed bilateral opacities. Patient received Lasix 20mg IVP.  /84. HR: 74bpm

## 2023-11-24 NOTE — H&P ADULT - ASSESSMENT
The patient is a 77 year old male with a history of CAD s/p 1v CABG, (4/1982), MI in 8/1993 (no intervention), HTN, s/p cystoscopy and Urolift implant procedure           - New EKG changes, f/u TTE  - Chest X-ray bilateral congestion  - BNP 4600 Assessment:  The patient is a 77 year old male former smoker with a history of CAD s/p 1v CABG, (4/1982), MI in 8/1993 (no intervention), HTN, bilateral knee surgeries, s/p cystoscopy and Urolift implant procedure who presents from an urgent care with worsening SOB and a dry cough x1 week. Patient is admitted to cardiac telemetry for further cardiac work-up and medical management.      Plan:    #Volume overload  Chest Xray bilateral congestion  BNP 4600 on arrival  - Monitor on telemetry  - Monitor electrolytes, K>4 and Mag>2  - Strict I&Os, daily weights  - F/u TTE  - Received Lasix 20mg IVP x1  - Cont Lasix 20mg IVP BID    #Hypertension   - /84 upon arrival  - Monitor BP  - Cont home atenolol 100mg daily  - Cont home enalapril 20mg BID  - Cont home amlodipine 5mg daily    #CAD  s/p CABG (1982)  H/o MI in 8/1993  EKG changes in inferior leads  Trop 0.01, f/u 8pm and AM troponin  - Cont atorvastatin 20mg qhs  - Cont fenofibrate 145mg daily  - Will assess the need for ischemic work-up    #Cough x1 week  - Tessalon pearls PRN     #Hypothyroidism  - F/u TSH  - Cont Levothyroxine 25mcg daily    #H/o BPH  s/p urolift  - Cont home tamsulosin 0.4mg        [id number]

## 2023-11-24 NOTE — ED ADULT TRIAGE NOTE - CHIEF COMPLAINT QUOTE
Pt c/o dyspnea on exertion x 4 days. Pt also coughing and congested. Pt c/o cough, congested and SOB x 3 days

## 2023-11-24 NOTE — ED ADULT NURSE NOTE - NSFALLHARMRISKINTERV_ED_ALL_ED
Assistance OOB with selected safe patient handling equipment if applicable/Communicate risk of Fall with Harm to all staff, patient, and family/Provide visual cue: red socks, yellow wristband, yellow gown, etc/Reinforce activity limits and safety measures with patient and family/Bed in lowest position, wheels locked, appropriate side rails in place/Call bell, personal items and telephone in reach/Instruct patient to call for assistance before getting out of bed/chair/stretcher/Non-slip footwear applied when patient is off stretcher/[place] to call system/Physically safe environment - no spills, clutter or unnecessary equipment/Purposeful Proactive Rounding/Room/bathroom lighting operational, light cord in reach

## 2023-11-24 NOTE — H&P ADULT - NSHPLABSRESULTS_GEN_ALL_CORE
- ECG (11/24/23):  Ventricular Rate 67 BPM  Atrial Rate 67 BPM  P-R Interval 168 ms  QRS Duration 102 ms  Q-T Interval 416 ms  QTC Calculation(Bazett) 439 ms  R Axis 101 degrees  T Axis -28 degrees  Diagnosis Line Normal sinus rhythm  Rightward axis  Inferior infarct , age undetermined  Abnormal ECG    - Updated TTE pending.    Prior TTE: (6/16/22)  Summary:  1. Left ventricular ejection fraction is estimated at 45-50%.  2. Mildly decreased global left ventricular systolic function.  3. Basal and mid inferior wall and basal and mid inferolateral wall are abnormal as described   above.  4. Spectral Doppler shows impaired relaxation pattern of left ventricular myocardial filling   (Grade I diastolic dysfunction).  5. Moderately dilated left atrium.  6. Mild aortic regurgitation.  7. Mild-to-moderate martin regurgitation.  8. Mild tricuspid regurgitation.  9. Mild dilatation of the ascending aorta measuring approximately 4.0 cm    - Radiology:  Chest Xray (11/24/23)  Impression:  Bibasilar opacities/effusions.    - Labs:                        14.3   6.58  )-----------( 169      ( 24 Nov 2023 14:59 )             42.4     11-24    141  |  104  |  31<H>  ----------------------------<  112<H>  4.2   |  25  |  1.0    Ca    9.9      24 Nov 2023 14:59    TPro  6.8  /  Alb  4.9  /  TBili  1.1  /  DBili  x   /  AST  55<H>  /  ALT  46<H>  /  AlkPhos  65  11-24    LIVER FUNCTIONS - ( 24 Nov 2023 14:59 )  Alb: 4.9 g/dL / Pro: 6.8 g/dL / ALK PHOS: 65 U/L / ALT: 46 U/L / AST: 55 U/L / GGT: x             Troponin T, Serum: 0.01 ng/mL (11-24 @ 14:59)    CARDIAC MARKERS ( 24 Nov 2023 14:59 )  x     / 0.01 ng/mL / x     / x     / x            Lactate Trend    Urinalysis Basic - ( 24 Nov 2023 14:59 )    Color: x / Appearance: x / SG: x / pH: x  Gluc: 112 mg/dL / Ketone: x  / Bili: x / Urobili: x   Blood: x / Protein: x / Nitrite: x   Leuk Esterase: x / RBC: x / WBC x   Sq Epi: x / Non Sq Epi: x / Bacteria: x

## 2023-11-24 NOTE — ED PROVIDER NOTE - OBJECTIVE STATEMENT
Patient is a 77 year old male pmhx of CAD s/p MI, HTN who presents from an urgent care with worsening SOB and congestion, dry cough x1 week. States he had sick contacts prior to onset of sx. Otherwise denies any fever, chills, headache, changes in vision, cp, palpitations, n/v/d, abd pain, constipation, urinary complaints, lower extremity pain/swelling.

## 2023-11-24 NOTE — ED ADULT NURSE NOTE - OBJECTIVE STATEMENT
Pt c/o SOB and productive cough Pt c/o worsening SOB and productive cough. States he has dyspnea on exertion. No chest pain.

## 2023-11-24 NOTE — H&P ADULT - NSHPREVIEWOFSYSTEMS_GEN_ALL_CORE
A complete 10-point review of systems was obtained and is negative except as stated in the interval history.

## 2023-11-25 LAB
ANION GAP SERPL CALC-SCNC: 10 MMOL/L — SIGNIFICANT CHANGE UP (ref 7–14)
ANION GAP SERPL CALC-SCNC: 10 MMOL/L — SIGNIFICANT CHANGE UP (ref 7–14)
ANION GAP SERPL CALC-SCNC: 11 MMOL/L — SIGNIFICANT CHANGE UP (ref 7–14)
ANION GAP SERPL CALC-SCNC: 11 MMOL/L — SIGNIFICANT CHANGE UP (ref 7–14)
BUN SERPL-MCNC: 29 MG/DL — HIGH (ref 10–20)
BUN SERPL-MCNC: 29 MG/DL — HIGH (ref 10–20)
BUN SERPL-MCNC: 30 MG/DL — HIGH (ref 10–20)
BUN SERPL-MCNC: 30 MG/DL — HIGH (ref 10–20)
CALCIUM SERPL-MCNC: 9.5 MG/DL — SIGNIFICANT CHANGE UP (ref 8.4–10.5)
CALCIUM SERPL-MCNC: 9.5 MG/DL — SIGNIFICANT CHANGE UP (ref 8.4–10.5)
CALCIUM SERPL-MCNC: 9.6 MG/DL — SIGNIFICANT CHANGE UP (ref 8.4–10.5)
CALCIUM SERPL-MCNC: 9.6 MG/DL — SIGNIFICANT CHANGE UP (ref 8.4–10.5)
CHLORIDE SERPL-SCNC: 99 MMOL/L — SIGNIFICANT CHANGE UP (ref 98–110)
CO2 SERPL-SCNC: 29 MMOL/L — SIGNIFICANT CHANGE UP (ref 17–32)
CO2 SERPL-SCNC: 29 MMOL/L — SIGNIFICANT CHANGE UP (ref 17–32)
CO2 SERPL-SCNC: 30 MMOL/L — SIGNIFICANT CHANGE UP (ref 17–32)
CO2 SERPL-SCNC: 30 MMOL/L — SIGNIFICANT CHANGE UP (ref 17–32)
CREAT SERPL-MCNC: 1 MG/DL — SIGNIFICANT CHANGE UP (ref 0.7–1.5)
CREAT SERPL-MCNC: 1 MG/DL — SIGNIFICANT CHANGE UP (ref 0.7–1.5)
CREAT SERPL-MCNC: 1.3 MG/DL — SIGNIFICANT CHANGE UP (ref 0.7–1.5)
CREAT SERPL-MCNC: 1.3 MG/DL — SIGNIFICANT CHANGE UP (ref 0.7–1.5)
EGFR: 57 ML/MIN/1.73M2 — LOW
EGFR: 57 ML/MIN/1.73M2 — LOW
EGFR: 78 ML/MIN/1.73M2 — SIGNIFICANT CHANGE UP
EGFR: 78 ML/MIN/1.73M2 — SIGNIFICANT CHANGE UP
GLUCOSE SERPL-MCNC: 132 MG/DL — HIGH (ref 70–99)
GLUCOSE SERPL-MCNC: 132 MG/DL — HIGH (ref 70–99)
GLUCOSE SERPL-MCNC: 212 MG/DL — HIGH (ref 70–99)
GLUCOSE SERPL-MCNC: 212 MG/DL — HIGH (ref 70–99)
HCT VFR BLD CALC: 43.3 % — SIGNIFICANT CHANGE UP (ref 42–52)
HCT VFR BLD CALC: 43.3 % — SIGNIFICANT CHANGE UP (ref 42–52)
HGB BLD-MCNC: 14.5 G/DL — SIGNIFICANT CHANGE UP (ref 14–18)
HGB BLD-MCNC: 14.5 G/DL — SIGNIFICANT CHANGE UP (ref 14–18)
MAGNESIUM SERPL-MCNC: 1.7 MG/DL — LOW (ref 1.8–2.4)
MAGNESIUM SERPL-MCNC: 1.7 MG/DL — LOW (ref 1.8–2.4)
MAGNESIUM SERPL-MCNC: 2.4 MG/DL — SIGNIFICANT CHANGE UP (ref 1.8–2.4)
MAGNESIUM SERPL-MCNC: 2.4 MG/DL — SIGNIFICANT CHANGE UP (ref 1.8–2.4)
MCHC RBC-ENTMCNC: 31.7 PG — HIGH (ref 27–31)
MCHC RBC-ENTMCNC: 31.7 PG — HIGH (ref 27–31)
MCHC RBC-ENTMCNC: 33.5 G/DL — SIGNIFICANT CHANGE UP (ref 32–37)
MCHC RBC-ENTMCNC: 33.5 G/DL — SIGNIFICANT CHANGE UP (ref 32–37)
MCV RBC AUTO: 94.5 FL — HIGH (ref 80–94)
MCV RBC AUTO: 94.5 FL — HIGH (ref 80–94)
NRBC # BLD: 0 /100 WBCS — SIGNIFICANT CHANGE UP (ref 0–0)
NRBC # BLD: 0 /100 WBCS — SIGNIFICANT CHANGE UP (ref 0–0)
PLATELET # BLD AUTO: 165 K/UL — SIGNIFICANT CHANGE UP (ref 130–400)
PLATELET # BLD AUTO: 165 K/UL — SIGNIFICANT CHANGE UP (ref 130–400)
PMV BLD: 11.8 FL — HIGH (ref 7.4–10.4)
PMV BLD: 11.8 FL — HIGH (ref 7.4–10.4)
POTASSIUM SERPL-MCNC: 3.5 MMOL/L — SIGNIFICANT CHANGE UP (ref 3.5–5)
POTASSIUM SERPL-MCNC: 3.5 MMOL/L — SIGNIFICANT CHANGE UP (ref 3.5–5)
POTASSIUM SERPL-MCNC: 4.4 MMOL/L — SIGNIFICANT CHANGE UP (ref 3.5–5)
POTASSIUM SERPL-MCNC: 4.4 MMOL/L — SIGNIFICANT CHANGE UP (ref 3.5–5)
POTASSIUM SERPL-SCNC: 3.5 MMOL/L — SIGNIFICANT CHANGE UP (ref 3.5–5)
POTASSIUM SERPL-SCNC: 3.5 MMOL/L — SIGNIFICANT CHANGE UP (ref 3.5–5)
POTASSIUM SERPL-SCNC: 4.4 MMOL/L — SIGNIFICANT CHANGE UP (ref 3.5–5)
POTASSIUM SERPL-SCNC: 4.4 MMOL/L — SIGNIFICANT CHANGE UP (ref 3.5–5)
RAPID RVP RESULT: SIGNIFICANT CHANGE UP
RAPID RVP RESULT: SIGNIFICANT CHANGE UP
RBC # BLD: 4.58 M/UL — LOW (ref 4.7–6.1)
RBC # BLD: 4.58 M/UL — LOW (ref 4.7–6.1)
RBC # FLD: 12.8 % — SIGNIFICANT CHANGE UP (ref 11.5–14.5)
RBC # FLD: 12.8 % — SIGNIFICANT CHANGE UP (ref 11.5–14.5)
SARS-COV-2 RNA SPEC QL NAA+PROBE: SIGNIFICANT CHANGE UP
SARS-COV-2 RNA SPEC QL NAA+PROBE: SIGNIFICANT CHANGE UP
SODIUM SERPL-SCNC: 138 MMOL/L — SIGNIFICANT CHANGE UP (ref 135–146)
SODIUM SERPL-SCNC: 138 MMOL/L — SIGNIFICANT CHANGE UP (ref 135–146)
SODIUM SERPL-SCNC: 140 MMOL/L — SIGNIFICANT CHANGE UP (ref 135–146)
SODIUM SERPL-SCNC: 140 MMOL/L — SIGNIFICANT CHANGE UP (ref 135–146)
WBC # BLD: 5.13 K/UL — SIGNIFICANT CHANGE UP (ref 4.8–10.8)
WBC # BLD: 5.13 K/UL — SIGNIFICANT CHANGE UP (ref 4.8–10.8)
WBC # FLD AUTO: 5.13 K/UL — SIGNIFICANT CHANGE UP (ref 4.8–10.8)
WBC # FLD AUTO: 5.13 K/UL — SIGNIFICANT CHANGE UP (ref 4.8–10.8)

## 2023-11-25 PROCEDURE — 93010 ELECTROCARDIOGRAM REPORT: CPT

## 2023-11-25 PROCEDURE — 93306 TTE W/DOPPLER COMPLETE: CPT | Mod: 26

## 2023-11-25 PROCEDURE — 99232 SBSQ HOSP IP/OBS MODERATE 35: CPT

## 2023-11-25 RX ORDER — CARVEDILOL PHOSPHATE 80 MG/1
6.25 CAPSULE, EXTENDED RELEASE ORAL EVERY 12 HOURS
Refills: 0 | Status: DISCONTINUED | OUTPATIENT
Start: 2023-11-26 | End: 2023-11-26

## 2023-11-25 RX ORDER — POTASSIUM CHLORIDE 20 MEQ
40 PACKET (EA) ORAL EVERY 4 HOURS
Refills: 0 | Status: COMPLETED | OUTPATIENT
Start: 2023-11-25 | End: 2023-11-25

## 2023-11-25 RX ORDER — AMLODIPINE BESYLATE 2.5 MG/1
10 TABLET ORAL DAILY
Refills: 0 | Status: DISCONTINUED | OUTPATIENT
Start: 2023-11-25 | End: 2023-11-26

## 2023-11-25 RX ORDER — MAGNESIUM SULFATE 500 MG/ML
2 VIAL (ML) INJECTION ONCE
Refills: 0 | Status: COMPLETED | OUTPATIENT
Start: 2023-11-25 | End: 2023-11-25

## 2023-11-25 RX ORDER — AMLODIPINE BESYLATE 2.5 MG/1
5 TABLET ORAL ONCE
Refills: 0 | Status: COMPLETED | OUTPATIENT
Start: 2023-11-25 | End: 2023-11-25

## 2023-11-25 RX ADMIN — TAMSULOSIN HYDROCHLORIDE 0.4 MILLIGRAM(S): 0.4 CAPSULE ORAL at 21:46

## 2023-11-25 RX ADMIN — Medication 40 MILLIEQUIVALENT(S): at 14:42

## 2023-11-25 RX ADMIN — ATORVASTATIN CALCIUM 20 MILLIGRAM(S): 80 TABLET, FILM COATED ORAL at 21:47

## 2023-11-25 RX ADMIN — Medication 25 MICROGRAM(S): at 06:42

## 2023-11-25 RX ADMIN — Medication 40 MILLIEQUIVALENT(S): at 17:02

## 2023-11-25 RX ADMIN — Medication 600 MILLIGRAM(S): at 21:46

## 2023-11-25 RX ADMIN — AMLODIPINE BESYLATE 5 MILLIGRAM(S): 2.5 TABLET ORAL at 11:32

## 2023-11-25 RX ADMIN — Medication 20 MILLIGRAM(S): at 17:02

## 2023-11-25 RX ADMIN — Medication 25 GRAM(S): at 14:43

## 2023-11-25 RX ADMIN — AMLODIPINE BESYLATE 5 MILLIGRAM(S): 2.5 TABLET ORAL at 06:42

## 2023-11-25 RX ADMIN — Medication 20 MILLIGRAM(S): at 06:42

## 2023-11-25 RX ADMIN — Medication 100 MILLIGRAM(S): at 13:45

## 2023-11-25 RX ADMIN — ATENOLOL 100 MILLIGRAM(S): 25 TABLET ORAL at 06:42

## 2023-11-25 RX ADMIN — Medication 20 MILLIGRAM(S): at 13:45

## 2023-11-25 RX ADMIN — Medication 145 MILLIGRAM(S): at 11:32

## 2023-11-25 RX ADMIN — Medication 20 MILLIGRAM(S): at 06:43

## 2023-11-25 RX ADMIN — ENOXAPARIN SODIUM 40 MILLIGRAM(S): 100 INJECTION SUBCUTANEOUS at 21:51

## 2023-11-25 RX ADMIN — Medication 81 MILLIGRAM(S): at 11:32

## 2023-11-25 NOTE — PROGRESS NOTE ADULT - ASSESSMENT
Assessment:  The patient is a 77 year old male former smoker with a history of CAD s/p 1v CABG, (4/1982), MI in 8/1993 (no intervention), HTN, bilateral knee surgeries, s/p cystoscopy and Urolift implant procedure who presents from an urgent care with worsening SOB and a dry cough x1 week. Patient is admitted to cardiac telemetry for further cardiac work-up and medical management.      Plan:    #Volume overload  Chest Xray bilateral congestion  BNP 4600 on arrival  - Monitor on telemetry  - Monitor electrolytes, K>4 and Mag>2  - Strict I&Os, daily weights  - F/u TTE  - Received Lasix 20mg IVP x1  - Cont Lasix 20mg IVP BID    #Hypertension   - /84 upon arrival  - Monitor BP  - Cont home atenolol 100mg daily  - Cont home enalapril 20mg BID  - Cont home amlodipine 5mg daily    #CAD  s/p CABG (1982)  H/o MI in 8/1993  EKG changes in inferior leads  Trop 0.01, f/u 8pm and AM troponin  - Cont atorvastatin 20mg qhs  - Cont fenofibrate 145mg daily  - Will assess the need for ischemic work-up    #Cough x1 week  - Tessalon pearls PRN     #Hypothyroidism  - F/u TSH  - Cont Levothyroxine 25mcg daily    #H/o BPH  s/p urolift  - Cont home tamsulosin 0.4mg        [id number]   Assessment:  The patient is a 77 year old male former smoker with a history of CAD s/p 1v CABG, (4/1982), MI in 8/1993 (no intervention), HTN, bilateral knee surgeries, s/p cystoscopy and Urolift implant procedure who presents from an urgent care with worsening SOB and a dry cough x1 week. Patient is admitted to cardiac telemetry for further cardiac work-up and medical management.      Plan:    #Volume overload  on admission Chest Xray bilateral congestion, BNP 4600 . Received Lasix 20mg IVP x1  - Cont Lasix 20mg IVP BID  - Monitor on telemetry  - Monitor electrolytes, K>4 and Mag>2. BMP bid While on BID diuretic dosage  - Strict I&Os, daily weights  - F/u TTE results  - improvemen in  Cough; cont on  Tessalon pearls PRN, F/U RVP result     #Hypertension urgency  - BP continue to be elevated. Stop atenolol and start on coreg 6.25mg BID  - Cont home enalapril 20mg BID  - increase amlodipine to 10mg  daily  - check aldosterone and renin plasma in Am     #CAD  s/p CABG (1982)  H/o MI in 8/1993  EKG changes in inferior leads  Trop 0.01, f/u 8pm and AM troponin  - Cont atorvastatin 20mg qhs  - Cont fenofibrate 145mg daily  - Will assess the need for ischemic work-up       #Hypothyroidism  - F/u TSH  - Cont Levothyroxine 25mcg daily    #H/o BPH  s/p urolift  - Cont home tamsulosin 0.4mg        [id number]

## 2023-11-25 NOTE — PROGRESS NOTE ADULT - SUBJECTIVE AND OBJECTIVE BOX
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    Vitals:  T(F): 98.1, Max: 98.6 (11-25 @ 00:00)  HR: 61 (57 - 74)  BP: 180/85 (138/71 - 181/81)  RR: 18 (18 - 20)  SpO2: 96% (93% - 96%)    Ins & outs:     11-24 @ 07:01  -  11-25 @ 07:00  --------------------------------------------------------  IN: 0 mL / OUT: 1800 mL / NET: -1800 mL    11-25 @ 07:01  -  11-25 @ 09:14  --------------------------------------------------------  IN: 0 mL / OUT: 600 mL / NET: -600 mL      Weight trend:  Weight (kg): 77.1 (11-24)    Physical exam:  General: No apparent distress  HEENT: Anicteric sclera. Moist mucous membranes. no JVD noted   Cardiac: Regular rate and rhythm. No murmurs, rubs, or gallops.   Vascular: Symmetric radial pulses. Dorsalis pedis pulses palpable.   Respiratory: Normal effort. ***Bibasilar crackles/ Clear to ascultation***  Abdomen: Soft, nontender. Audible bowel sounds.   Extremities: Warm with *** edema. No cyanosis or clubbing.   Skin: Warm and dry. No rash.   Neurologic: Grossly normal motor function.   Psychiatric: Euthymic. Oriented to person, place, and time.     Data reviewed:  - Telemetry:  - ECG (date***):  - Echo (date***):  - Radiology:    - Labs:                        14.3   6.58  )-----------( 169      ( 24 Nov 2023 14:59 )             42.4     11-24    141  |  104  |  31<H>  ----------------------------<  112<H>  4.2   |  25  |  1.0    Ca    9.9      24 Nov 2023 14:59    TPro  6.8  /  Alb  4.9  /  TBili  1.1  /  DBili  x   /  AST  55<H>  /  ALT  46<H>  /  AlkPhos  65  11-24    LIVER FUNCTIONS - ( 24 Nov 2023 14:59 )  Alb: 4.9 g/dL / Pro: 6.8 g/dL / ALK PHOS: 65 U/L / ALT: 46 U/L / AST: 55 U/L / GGT: x             Troponin T, Serum: <0.01 ng/mL (11-24 @ 20:25)  Troponin T, Serum: 0.01 ng/mL (11-24 @ 14:59)    CARDIAC MARKERS ( 24 Nov 2023 20:25 )  x     / <0.01 ng/mL / x     / x     / x      CARDIAC MARKERS ( 24 Nov 2023 14:59 )  x     / 0.01 ng/mL / x     / x     / x            Urinalysis Basic - ( 24 Nov 2023 14:59 )    Color: x / Appearance: x / SG: x / pH: x  Gluc: 112 mg/dL / Ketone: x  / Bili: x / Urobili: x   Blood: x / Protein: x / Nitrite: x   Leuk Esterase: x / RBC: x / WBC x   Sq Epi: x / Non Sq Epi: x / Bacteria: x      - Cultures:    Medications:  amLODIPine   Tablet 5 milliGRAM(s) Oral daily  aspirin  chewable 81 milliGRAM(s) Oral daily  atenolol  Tablet 100 milliGRAM(s) Oral daily  atorvastatin 20 milliGRAM(s) Oral at bedtime  enalapril 20 milliGRAM(s) Oral two times a day  enoxaparin Injectable 40 milliGRAM(s) SubCutaneous every 24 hours  fenofibrate Tablet 145 milliGRAM(s) Oral daily  furosemide   Injectable 20 milliGRAM(s) IV Push two times a day  levothyroxine 25 MICROGram(s) Oral daily  tamsulosin 0.4 milliGRAM(s) Oral at bedtime    Drips:    PRN:              Chief complaint: Patient is a 78y old  Male who presents with a chief complaint of SOB (24 Nov 2023 16:37)    Interval history:  admitted with voluem overload and HTN urgeny.  Patient recieved x2 doses of lasix 20mg. 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    Vitals:  T(F): 98.1, Max: 98.6 (11-25 @ 00:00)  HR: 61 (57 - 74)  BP: 180/85 (138/71 - 181/81)  RR: 18 (18 - 20)  SpO2: 96% (93% - 96%)    Ins & outs:     11-24 @ 07:01  -  11-25 @ 07:00  --------------------------------------------------------  IN: 0 mL / OUT: 1800 mL / NET: -1800 mL    11-25 @ 07:01  -  11-25 @ 09:14  --------------------------------------------------------  IN: 0 mL / OUT: 600 mL / NET: -600 mL      Weight trend:  Weight (kg): 77.1 (11-24)    Physical exam:  General: No apparent distress  HEENT: Anicteric sclera. Moist mucous membranes. no JVD noted   Cardiac: Regular rate and rhythm. No murmurs, rubs, or gallops.   Vascular: Symmetric radial pulses. Dorsalis pedis pulses palpable.   Respiratory: Normal effort. Bibasilar crackles  Abdomen: Soft, nontender. Audible bowel sounds.   Extremities: Warm with no edema. No cyanosis or clubbing.   Skin: Warm and dry. No rash.   Neurologic: Grossly normal motor function.   Psychiatric: Euthymic. Oriented to person, place, and time.     Data reviewed:  - Telemetry: NSB , HR 51-60'S  - ECG  < from: 12 Lead ECG (11.24.23 @ 18:41) >  Diagnosis Line Normal sinus rhythm  Rightward axis  Inferior infarct , age undetermined  Abnormal ECG        - Echo (pending):    - Radiology:  < from: Xray Chest 1 View- PORTABLE-Urgent (11.24.23 @ 14:49) >  Impression:    Bibasilar opacities/effusions.    - Labs:                        14.3   6.58  )-----------( 169      ( 24 Nov 2023 14:59 )             42.4     11-24    141  |  104  |  31<H>  ----------------------------<  112<H>  4.2   |  25  |  1.0    Ca    9.9      24 Nov 2023 14:59    TPro  6.8  /  Alb  4.9  /  TBili  1.1  /  DBili  x   /  AST  55<H>  /  ALT  46<H>  /  AlkPhos  65  11-24    LIVER FUNCTIONS - ( 24 Nov 2023 14:59 )  Alb: 4.9 g/dL / Pro: 6.8 g/dL / ALK PHOS: 65 U/L / ALT: 46 U/L / AST: 55 U/L / GGT: x             Troponin T, Serum: <0.01 ng/mL (11-24 @ 20:25)  Troponin T, Serum: 0.01 ng/mL (11-24 @ 14:59)    CARDIAC MARKERS ( 24 Nov 2023 20:25 )  x     / <0.01 ng/mL / x     / x     / x      CARDIAC MARKERS ( 24 Nov 2023 14:59 )  x     / 0.01 ng/mL / x     / x     / x            Urinalysis Basic - ( 24 Nov 2023 14:59 )    Color: x / Appearance: x / SG: x / pH: x  Gluc: 112 mg/dL / Ketone: x  / Bili: x / Urobili: x   Blood: x / Protein: x / Nitrite: x   Leuk Esterase: x / RBC: x / WBC x   Sq Epi: x / Non Sq Epi: x / Bacteria: x      - Cultures:    Medications:  amLODIPine   Tablet 5 milliGRAM(s) Oral daily  aspirin  chewable 81 milliGRAM(s) Oral daily  atenolol  Tablet 100 milliGRAM(s) Oral daily  atorvastatin 20 milliGRAM(s) Oral at bedtime  enalapril 20 milliGRAM(s) Oral two times a day  enoxaparin Injectable 40 milliGRAM(s) SubCutaneous every 24 hours  fenofibrate Tablet 145 milliGRAM(s) Oral daily  furosemide   Injectable 20 milliGRAM(s) IV Push two times a day  levothyroxine 25 MICROGram(s) Oral daily  tamsulosin 0.4 milliGRAM(s) Oral at bedtime    Drips:    PRN:

## 2023-11-25 NOTE — PROGRESS NOTE ADULT - NS ATTEND AMEND GEN_ALL_CORE FT
77yoM with CAD s/p CABG (in 4/1982 in the setting of substernal burning with minimal ambulation), MI (in 8/1993, medically managed), and HTN who presented with 1 week of worsening dyspnea on exertion.     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 Says he was congested and couldn't breath through his nose, associated with a dry cough. Over the past week, he has noted increasing dyspnea with walking short distances. This morning, we was unable to walk from his car to his front door and he also became acutely dyspneic when showing. Denies orthopnea, PND, or DWAYNE. Endorses abdominal distention.     In the ED, HR 74 and /84. Lab work notable for AST/ALT 55/46, troponin negative, and BNP 4610. ECG with sinus rhythm, 67 bpm, and new TWI in III and aVF. Repeat ECG stable. CXR with small pleural effusions.     Patient was treated with IV diuretics, with improvement in his symptoms. TTE pending.    Plan:   - Continue Lasix 20 mg IV BID  - Increase amlodipine to 10 mg daily   - Discontinue atenolol  - Start carvedilol 6.25 mg BID tomorrow   - Continue enalapril 20 mg BID  - Check renin and joselito   - Consider worsening CAD if symptoms do not improve with diuresis and BP control  - TTE ordered

## 2023-11-26 ENCOUNTER — TRANSCRIPTION ENCOUNTER (OUTPATIENT)
Age: 78
End: 2023-11-26

## 2023-11-26 VITALS
DIASTOLIC BLOOD PRESSURE: 66 MMHG | OXYGEN SATURATION: 93 % | RESPIRATION RATE: 18 BRPM | HEART RATE: 69 BPM | SYSTOLIC BLOOD PRESSURE: 132 MMHG | TEMPERATURE: 98 F

## 2023-11-26 LAB
ANION GAP SERPL CALC-SCNC: 13 MMOL/L — SIGNIFICANT CHANGE UP (ref 7–14)
ANION GAP SERPL CALC-SCNC: 13 MMOL/L — SIGNIFICANT CHANGE UP (ref 7–14)
BUN SERPL-MCNC: 31 MG/DL — HIGH (ref 10–20)
BUN SERPL-MCNC: 31 MG/DL — HIGH (ref 10–20)
CALCIUM SERPL-MCNC: 10.1 MG/DL — SIGNIFICANT CHANGE UP (ref 8.4–10.5)
CALCIUM SERPL-MCNC: 10.1 MG/DL — SIGNIFICANT CHANGE UP (ref 8.4–10.5)
CHLORIDE SERPL-SCNC: 100 MMOL/L — SIGNIFICANT CHANGE UP (ref 98–110)
CHLORIDE SERPL-SCNC: 100 MMOL/L — SIGNIFICANT CHANGE UP (ref 98–110)
CO2 SERPL-SCNC: 27 MMOL/L — SIGNIFICANT CHANGE UP (ref 17–32)
CO2 SERPL-SCNC: 27 MMOL/L — SIGNIFICANT CHANGE UP (ref 17–32)
CREAT SERPL-MCNC: 1 MG/DL — SIGNIFICANT CHANGE UP (ref 0.7–1.5)
CREAT SERPL-MCNC: 1 MG/DL — SIGNIFICANT CHANGE UP (ref 0.7–1.5)
EGFR: 78 ML/MIN/1.73M2 — SIGNIFICANT CHANGE UP
EGFR: 78 ML/MIN/1.73M2 — SIGNIFICANT CHANGE UP
GLUCOSE SERPL-MCNC: 178 MG/DL — HIGH (ref 70–99)
GLUCOSE SERPL-MCNC: 178 MG/DL — HIGH (ref 70–99)
HCT VFR BLD CALC: 42.7 % — SIGNIFICANT CHANGE UP (ref 42–52)
HCT VFR BLD CALC: 42.7 % — SIGNIFICANT CHANGE UP (ref 42–52)
HGB BLD-MCNC: 14.5 G/DL — SIGNIFICANT CHANGE UP (ref 14–18)
HGB BLD-MCNC: 14.5 G/DL — SIGNIFICANT CHANGE UP (ref 14–18)
MAGNESIUM SERPL-MCNC: 1.9 MG/DL — SIGNIFICANT CHANGE UP (ref 1.8–2.4)
MAGNESIUM SERPL-MCNC: 1.9 MG/DL — SIGNIFICANT CHANGE UP (ref 1.8–2.4)
MCHC RBC-ENTMCNC: 31.7 PG — HIGH (ref 27–31)
MCHC RBC-ENTMCNC: 31.7 PG — HIGH (ref 27–31)
MCHC RBC-ENTMCNC: 34 G/DL — SIGNIFICANT CHANGE UP (ref 32–37)
MCHC RBC-ENTMCNC: 34 G/DL — SIGNIFICANT CHANGE UP (ref 32–37)
MCV RBC AUTO: 93.2 FL — SIGNIFICANT CHANGE UP (ref 80–94)
MCV RBC AUTO: 93.2 FL — SIGNIFICANT CHANGE UP (ref 80–94)
NRBC # BLD: 0 /100 WBCS — SIGNIFICANT CHANGE UP (ref 0–0)
NRBC # BLD: 0 /100 WBCS — SIGNIFICANT CHANGE UP (ref 0–0)
NT-PROBNP SERPL-SCNC: 1237 PG/ML — HIGH (ref 0–300)
NT-PROBNP SERPL-SCNC: 1237 PG/ML — HIGH (ref 0–300)
PLATELET # BLD AUTO: 187 K/UL — SIGNIFICANT CHANGE UP (ref 130–400)
PLATELET # BLD AUTO: 187 K/UL — SIGNIFICANT CHANGE UP (ref 130–400)
PMV BLD: 12 FL — HIGH (ref 7.4–10.4)
PMV BLD: 12 FL — HIGH (ref 7.4–10.4)
POTASSIUM SERPL-MCNC: 4.3 MMOL/L — SIGNIFICANT CHANGE UP (ref 3.5–5)
POTASSIUM SERPL-MCNC: 4.3 MMOL/L — SIGNIFICANT CHANGE UP (ref 3.5–5)
POTASSIUM SERPL-SCNC: 4.3 MMOL/L — SIGNIFICANT CHANGE UP (ref 3.5–5)
POTASSIUM SERPL-SCNC: 4.3 MMOL/L — SIGNIFICANT CHANGE UP (ref 3.5–5)
RBC # BLD: 4.58 M/UL — LOW (ref 4.7–6.1)
RBC # BLD: 4.58 M/UL — LOW (ref 4.7–6.1)
RBC # FLD: 12.8 % — SIGNIFICANT CHANGE UP (ref 11.5–14.5)
RBC # FLD: 12.8 % — SIGNIFICANT CHANGE UP (ref 11.5–14.5)
SODIUM SERPL-SCNC: 140 MMOL/L — SIGNIFICANT CHANGE UP (ref 135–146)
SODIUM SERPL-SCNC: 140 MMOL/L — SIGNIFICANT CHANGE UP (ref 135–146)
WBC # BLD: 5.49 K/UL — SIGNIFICANT CHANGE UP (ref 4.8–10.8)
WBC # BLD: 5.49 K/UL — SIGNIFICANT CHANGE UP (ref 4.8–10.8)
WBC # FLD AUTO: 5.49 K/UL — SIGNIFICANT CHANGE UP (ref 4.8–10.8)
WBC # FLD AUTO: 5.49 K/UL — SIGNIFICANT CHANGE UP (ref 4.8–10.8)

## 2023-11-26 PROCEDURE — 99239 HOSP IP/OBS DSCHRG MGMT >30: CPT

## 2023-11-26 PROCEDURE — 93010 ELECTROCARDIOGRAM REPORT: CPT

## 2023-11-26 RX ORDER — CARVEDILOL PHOSPHATE 80 MG/1
1 CAPSULE, EXTENDED RELEASE ORAL
Qty: 60 | Refills: 0
Start: 2023-11-26 | End: 2023-12-25

## 2023-11-26 RX ORDER — AMLODIPINE BESYLATE 2.5 MG/1
1 TABLET ORAL
Qty: 30 | Refills: 0
Start: 2023-11-26 | End: 2023-12-25

## 2023-11-26 RX ORDER — AMLODIPINE BESYLATE 2.5 MG/1
1 TABLET ORAL
Qty: 0 | Refills: 0 | DISCHARGE

## 2023-11-26 RX ORDER — FUROSEMIDE 40 MG
1 TABLET ORAL
Qty: 30 | Refills: 0
Start: 2023-11-26 | End: 2023-12-25

## 2023-11-26 RX ORDER — GUAIFENESIN/DEXTROMETHORPHAN 600MG-30MG
10 TABLET, EXTENDED RELEASE 12 HR ORAL EVERY 6 HOURS
Refills: 0 | Status: DISCONTINUED | OUTPATIENT
Start: 2023-11-26 | End: 2023-11-26

## 2023-11-26 RX ORDER — FUROSEMIDE 40 MG
20 TABLET ORAL DAILY
Refills: 0 | Status: DISCONTINUED | OUTPATIENT
Start: 2023-11-27 | End: 2023-11-26

## 2023-11-26 RX ORDER — ASPIRIN/CALCIUM CARB/MAGNESIUM 324 MG
1 TABLET ORAL
Qty: 0 | Refills: 0 | DISCHARGE
Start: 2023-11-26

## 2023-11-26 RX ORDER — ATENOLOL 25 MG/1
1 TABLET ORAL
Refills: 0 | DISCHARGE

## 2023-11-26 RX ADMIN — Medication 10 MILLILITER(S): at 06:38

## 2023-11-26 RX ADMIN — Medication 145 MILLIGRAM(S): at 12:19

## 2023-11-26 RX ADMIN — Medication 25 MICROGRAM(S): at 05:53

## 2023-11-26 RX ADMIN — CARVEDILOL PHOSPHATE 6.25 MILLIGRAM(S): 80 CAPSULE, EXTENDED RELEASE ORAL at 05:53

## 2023-11-26 RX ADMIN — Medication 20 MILLIGRAM(S): at 05:54

## 2023-11-26 RX ADMIN — Medication 81 MILLIGRAM(S): at 12:19

## 2023-11-26 RX ADMIN — Medication 20 MILLIGRAM(S): at 05:52

## 2023-11-26 RX ADMIN — AMLODIPINE BESYLATE 10 MILLIGRAM(S): 2.5 TABLET ORAL at 05:52

## 2023-11-26 NOTE — DISCHARGE NOTE PROVIDER - NSDCFUSCHEDAPPT_GEN_ALL_CORE_FT
Chaya Alexis  [facility] Physician [facility]  CARDIOLOGY [address] [place] [address]  Scheduled Appointment: 12/20/2023     Chaya Alexis  [facility] Physician [facility]  CARDIOLOGY [address] [place] [address]  Scheduled Appointment: 12/20/2023     Chaya Alexis  [facility] Physician [facility]  CARDIOLOGY [address] [place] [address]  Scheduled Appointment: 12/20/2023

## 2023-11-26 NOTE — DISCHARGE NOTE PROVIDER - CARE PROVIDERS DIRECT ADDRESSES
[email or web address]

## 2023-11-26 NOTE — DISCHARGE NOTE PROVIDER - NSDCMRMEDTOKEN_GEN_ALL_CORE_FT
amLODIPine 5 mg oral tablet: 1 tab(s) orally once a day  aspirin 81 mg oral tablet, chewable: 1 tab(s) orally once a day  atorvastatin 20 mg oral tablet: 1 tab(s) orally once a day  carvedilol 6.25 mg oral tablet: 1 tab(s) orally every 12 hours  enalapril 20 mg oral tablet: 1 tab(s) orally 2 times a day  fenofibrate 145 mg oral tablet: 1 tab(s) orally once a day  furosemide 20 mg oral tablet: 1 tab(s) orally once a day  levothyroxine 25 mcg (0.025 mg) oral tablet: 1 tab(s) orally once a day  tamsulosin 0.4 mg oral capsule: 1 cap(s) orally once a day   amLODIPine 10 mg oral tablet: 1 tab(s) orally once a day  aspirin 81 mg oral tablet, chewable: 1 tab(s) orally once a day  atorvastatin 20 mg oral tablet: 1 tab(s) orally once a day  carvedilol 6.25 mg oral tablet: 1 tab(s) orally every 12 hours  enalapril 20 mg oral tablet: 1 tab(s) orally 2 times a day  fenofibrate 145 mg oral tablet: 1 tab(s) orally once a day  furosemide 20 mg oral tablet: 1 tab(s) orally once a day as needed for  shortness of breath and volume overload symptoms  levothyroxine 25 mcg (0.025 mg) oral tablet: 1 tab(s) orally once a day  tamsulosin 0.4 mg oral capsule: 1 cap(s) orally once a day

## 2023-11-26 NOTE — DISCHARGE NOTE NURSING/CASE MANAGEMENT/SOCIAL WORK - PATIENT PORTAL LINK FT
You can access the FollowMyHealth Patient Portal offered by [facility] by registering at the following website: [email or web address]. By joining [facility]’s FollowMyHealth portal, you will also be able to view your health information using other applications (apps) compatible with our system.

## 2023-11-26 NOTE — DISCHARGE NOTE PROVIDER - NSDCCPCAREPLAN_GEN_ALL_CORE_FT
PRINCIPAL DISCHARGE DIAGNOSIS  Diagnosis: Chronic heart failure with preserved ejection fraction (HFpEF)  Assessment and Plan of Treatment:   - Please take Lasix 20mg daily  - If you experience shortness of breath, leg swelling, a weight gain of 2lb in a day of 5lb in a week, please take an extra dose of Lasix that day.      SECONDARY DISCHARGE DIAGNOSES  Diagnosis: ST segment depression  Assessment and Plan of Treatment:     Diagnosis: Chronic heart failure with preserved ejection fraction (HFpEF)  Assessment and Plan of Treatment:      PRINCIPAL DISCHARGE DIAGNOSIS  Diagnosis: Chronic heart failure with preserved ejection fraction (HFpEF)  Assessment and Plan of Treatment: - Please take Lasix 20mg AS NEEDED  - If you experience shortness of breath, leg swelling, a weight gain of 2lb in a day of 5lb in a week, please take a dose of Lasix that day.      SECONDARY DISCHARGE DIAGNOSES  Diagnosis: ST segment depression  Assessment and Plan of Treatment:     Diagnosis: Chronic heart failure with preserved ejection fraction (HFpEF)  Assessment and Plan of Treatment:

## 2023-11-26 NOTE — PROGRESS NOTE ADULT - ASSESSMENT
Assessment:  The patient is a 77 year old male former smoker with a history of CAD s/p 1v CABG, (4/1982), MI in 8/1993 (no intervention), HTN, bilateral knee surgeries, s/p cystoscopy and Urolift implant procedure who presents from an urgent care with worsening SOB and a dry cough x1 week. Patient is admitted to cardiac telemetry for further cardiac work-up and medical management.      Plan:    # Volume overload  on admission Chest Xray bilateral congestion, BNP 4600 .   - Stop IV Lasix  - Check BMP/MG  - Start Lasix 20mg PO Daily on DC  - Monitor on telemetry  - Strict I&Os, daily weights  - F/u TTE results    #Hypertension urgency  - Cont  coreg 6.25mg BID  - Cont home enalapril 20mg BID  - Cont amlodipine to 10mg  daily  - check aldosterone and renin plasma in Am     #CAD  s/p CABG (1982)  H/o MI in 8/1993  EKG changes in inferior leads  -Troponins negative for ACS  - Cont atorvastatin 20mg qhs  - Cont fenofibrate 145mg daily       #Hypothyroidism  - F/u TSH  - Cont Levothyroxine 25mcg daily    #H/o BPH  s/p urolift  - Cont home tamsulosin 0.4mg

## 2023-11-26 NOTE — DISCHARGE NOTE PROVIDER - CARE PROVIDER_API CALL
Chaya Alexis  Cardiovascular Disease  [address] [place] [address], [facility] [address]  [place] [address]  Phone: [phone]  Fax: [phone]  Follow Up Time:    Chaya Alexis  Cardiovascular Disease  [address] [place] [address], [facility] [address]  [place] [address]  Phone: [phone]  Fax: [phone]  Follow Up Time:    Chaya Alexis  Cardiovascular Disease  [address] [place] [address], [facility] [address]  [place] [address]  Phone: [phone]  Fax: [phone]  Follow Up Time:

## 2023-11-26 NOTE — DISCHARGE NOTE PROVIDER - HOSPITAL COURSE
The patient is a 77 year old male former smoker with a history of CAD s/p 1v CABG, (4/1982), MI in 8/1993 (no intervention), HTN, bilateral knee surgeries, s/p cystoscopy and Urolift implant procedure who presents from an urgent care with worsening SOB and a dry cough x1 week. Patient is admitted to cardiac telemetry for further cardiac work-up and medical management. On admission Chest Xray bilateral congestion, BNP 4600, Trops negative. Patient was diuresed with Lasix 20mg IVP BID and symptoms improved. Patient medications adjusted for better BP management. Patient will be discharged on Lasix 20mg PO and instructed to take extra if he experiences symptoms of volume overload. Patient will follow-up with his cardiologist Dr. Alexis. Patient is being discharged in stable condition.

## 2023-11-26 NOTE — PROGRESS NOTE ADULT - SUBJECTIVE AND OBJECTIVE BOX
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    Vitals:  ICU Vital Signs Last 24 Hrs  T(C): 36.6 (26 Nov 2023 07:43), Max: 36.6 (25 Nov 2023 16:00)  T(F): 97.8 (26 Nov 2023 07:43), Max: 97.9 (25 Nov 2023 16:00)  HR: 69 (26 Nov 2023 07:43) (54 - 69)  BP: 132/66 (26 Nov 2023 07:43) (132/66 - 162/77)  BP(mean): 93 (26 Nov 2023 07:43) (89 - 110)    RR: 18 (26 Nov 2023 07:43) (18 - 23)  SpO2: 93% (26 Nov 2023 07:43) (93% - 96%)    O2 Parameters below as of 26 Nov 2023 07:43  Patient On (Oxygen Delivery Method): room air            Physical exam:  General: No apparent distress  HEENT: Anicteric sclera. Moist mucous membranes. no JVD noted   Cardiac: Regular rate and rhythm. No murmurs, rubs, or gallops.   Vascular: Symmetric radial pulses. Dorsalis pedis pulses palpable.   Respiratory: Normal effort. Bibasilar crackles  Abdomen: Soft, nontender. Audible bowel sounds.   Extremities: Warm with no edema. No cyanosis or clubbing.   Skin: Warm and dry. No rash.   Neurologic: Grossly normal motor function.   Psychiatric: Euthymic. Oriented to person, place, and time.     Data reviewed:  - Telemetry: NSB , HR 51-60'S  - ECG  < from: 12 Lead ECG (11.24.23 @ 18:41) >  Diagnosis Line Normal sinus rhythm  Rightward axis  Inferior infarct , age undetermined  Abnormal ECG        - Echo (pending):    - Radiology:  < from: Xray Chest 1 View- PORTABLE-Urgent (11.24.23 @ 14:49) >  Impression:    Bibasilar opacities/effusions.    - Labs:                                 14.5   5.13  )-----------( 165      ( 25 Nov 2023 12:31 )             43.3   11-25    140  |  99  |  29<H>  ----------------------------<  132<H>  4.4   |  30  |  1.3    Ca    9.5      25 Nov 2023 17:33  Mg     2.4     11-25    TPro  6.8  /  Alb  4.9  /  TBili  1.1  /  DBili  x   /  AST  55<H>  /  ALT  46<H>  /  AlkPhos  65  11-24        Urinalysis Basic - ( 24 Nov 2023 14:59 )    Color: x / Appearance: x / SG: x / pH: x  Gluc: 112 mg/dL / Ketone: x  / Bili: x / Urobili: x   Blood: x / Protein: x / Nitrite: x   Leuk Esterase: x / RBC: x / WBC x   Sq Epi: x / Non Sq Epi: x / Bacteria: x      - Cultures:    Medications:  amLODIPine   Tablet 5 milliGRAM(s) Oral daily  aspirin  chewable 81 milliGRAM(s) Oral daily  atenolol  Tablet 100 milliGRAM(s) Oral daily  atorvastatin 20 milliGRAM(s) Oral at bedtime  enalapril 20 milliGRAM(s) Oral two times a day  enoxaparin Injectable 40 milliGRAM(s) SubCutaneous every 24 hours  fenofibrate Tablet 145 milliGRAM(s) Oral daily  furosemide   Injectable 20 milliGRAM(s) IV Push two times a day  levothyroxine 25 MICROGram(s) Oral daily  tamsulosin 0.4 milliGRAM(s) Oral at bedtime    Drips:    PRN:

## 2023-11-28 LAB
ALDOST SERPL-MCNC: 8.5 NG/DL — SIGNIFICANT CHANGE UP
ALDOST SERPL-MCNC: 8.5 NG/DL — SIGNIFICANT CHANGE UP

## 2023-12-06 LAB
RENIN PLAS-CCNC: 0.37 NG/ML/HR — SIGNIFICANT CHANGE UP (ref 0.17–5.38)
RENIN PLAS-CCNC: 0.37 NG/ML/HR — SIGNIFICANT CHANGE UP (ref 0.17–5.38)

## 2023-12-13 ENCOUNTER — APPOINTMENT (OUTPATIENT)
Dept: CARDIOLOGY | Facility: CLINIC | Age: 78
End: 2023-12-13
Payer: COMMERCIAL

## 2023-12-13 VITALS
HEART RATE: 64 BPM | WEIGHT: 177 LBS | DIASTOLIC BLOOD PRESSURE: 66 MMHG | BODY MASS INDEX: 26.91 KG/M2 | SYSTOLIC BLOOD PRESSURE: 120 MMHG

## 2023-12-13 PROCEDURE — 99214 OFFICE O/P EST MOD 30 MIN: CPT | Mod: 25

## 2023-12-13 PROCEDURE — 93000 ELECTROCARDIOGRAM COMPLETE: CPT

## 2023-12-13 RX ORDER — ATENOLOL 100 MG/1
100 TABLET ORAL DAILY
Refills: 0 | Status: DISCONTINUED | COMMUNITY
Start: 2021-04-07 | End: 2023-12-13

## 2023-12-13 RX ORDER — AMLODIPINE BESYLATE 5 MG/1
5 TABLET ORAL DAILY
Qty: 90 | Refills: 3 | Status: DISCONTINUED | COMMUNITY
Start: 2022-08-16 | End: 2023-12-13

## 2023-12-13 RX ORDER — CARVEDILOL 6.25 MG/1
6.25 TABLET, FILM COATED ORAL TWICE DAILY
Qty: 180 | Refills: 3 | Status: ACTIVE | COMMUNITY
Start: 1900-01-01 | End: 1900-01-01

## 2023-12-13 RX ORDER — TADALAFIL 20 MG/1
20 TABLET, FILM COATED ORAL
Qty: 30 | Refills: 3 | Status: DISCONTINUED | COMMUNITY
Start: 2023-08-30 | End: 2023-12-13

## 2023-12-13 RX ORDER — IRON,CARBONYL/CALCIUM 50-117MG
25 TABLET ORAL DAILY
Refills: 0 | Status: ACTIVE | COMMUNITY

## 2023-12-13 RX ORDER — FUROSEMIDE 20 MG/1
20 TABLET ORAL DAILY
Qty: 90 | Refills: 3 | Status: ACTIVE | COMMUNITY
Start: 1900-01-01 | End: 1900-01-01

## 2023-12-13 RX ORDER — AMLODIPINE BESYLATE 10 MG/1
10 TABLET ORAL DAILY
Refills: 0 | Status: ACTIVE | COMMUNITY

## 2023-12-13 NOTE — DISCUSSION/SUMMARY
[FreeTextEntry1] : CAD s/p 1v CABG, (4/1982), MI 8/1993 (no intervention) Recent admission for HFpEF   Continue aspirin and Atorvastatin Continue Lasix 20 mg daily Continue Carvedilol 6.25 mg BID Continue Amlodipine 5 mg daily Continue Enalapril 20 mg BID Follow up 4-6 months Detail Level: Zone Initiate Treatment: Hydrocortisone ointment, non-drowsy antihistamine QAM (Xyzal) or Xyrtec (x4) Continue Regimen: Benadryl QHS

## 2023-12-13 NOTE — HISTORY OF PRESENT ILLNESS
[FreeTextEntry1] : Previously seen at [facility].  History of CAD s/p 1v CABG, (4/1982), MI in 8/1993 (no intervention), HTN, s/p cystoscopy and Urolift implant procedure.  Recent admission for acute HFpEF TTE - EF 50-55%, Mod MR, Mild AI  Denies any CP, SOB, palpitations, orthopnea/PND, dizziness or syncope.   EKG (6/23/2023):  NSR, IWMI, no ST-T changes TTE (6/2022):  EF 45-50%, basal-mid inferolateral hypokinesis, Mild AI, Mild-mod MR Lexiscan MPI (5/2022):  Mid-distal inferior infarction (scar) consistent with known prior MI, mild LV dysfunction LVEF 45-50%, no evidence of stress-induced ischemia

## 2024-03-21 ENCOUNTER — APPOINTMENT (OUTPATIENT)
Dept: CARDIOLOGY | Facility: CLINIC | Age: 79
End: 2024-03-21
Payer: COMMERCIAL

## 2024-03-21 VITALS
HEART RATE: 63 BPM | SYSTOLIC BLOOD PRESSURE: 130 MMHG | BODY MASS INDEX: 25.85 KG/M2 | WEIGHT: 170 LBS | DIASTOLIC BLOOD PRESSURE: 70 MMHG

## 2024-03-21 DIAGNOSIS — I25.10 ATHEROSCLEROTIC HEART DISEASE OF NATIVE CORONARY ARTERY W/OUT ANGINA PECTORIS: ICD-10-CM

## 2024-03-21 DIAGNOSIS — I10 ESSENTIAL (PRIMARY) HYPERTENSION: ICD-10-CM

## 2024-03-21 PROCEDURE — 99214 OFFICE O/P EST MOD 30 MIN: CPT | Mod: 25

## 2024-03-21 PROCEDURE — 93000 ELECTROCARDIOGRAM COMPLETE: CPT

## 2024-03-21 RX ORDER — TAMSULOSIN HYDROCHLORIDE 0.4 MG/1
0.4 CAPSULE ORAL
Qty: 90 | Refills: 3 | Status: DISCONTINUED | COMMUNITY
Start: 2023-11-08 | End: 2024-03-21

## 2024-03-21 NOTE — HISTORY OF PRESENT ILLNESS
[FreeTextEntry1] : Previously seen at [facility].  History of CAD s/p 1v CABG, (4/1982), MI in 8/1993 (no intervention), HTN, s/p cystoscopy and Urolift implant procedure.  Recent admission for acute HFpEF  No CP, SOB, palpitations, orthopnea/PND, dizziness or syncope   TTE (11/2023):  EF 50-55%, Mod MR, Mild AI TTE (6/2022):  EF 45-50%, basal-mid inferolateral hypokinesis, Mild AI, Mild-mod MR Lexiscan MPI (5/2022):  Mid-distal inferior infarction (scar) consistent with known prior MI, mild LV dysfunction LVEF 45-50%, no evidence of stress-induced ischemia

## 2024-03-21 NOTE — DISCUSSION/SUMMARY
[FreeTextEntry1] : CAD s/p 1v CABG, (4/1982), MI 8/1993 (no intervention) Recent admission for HFpEF  BP is controlled   Continue aspirin and Atorvastatin Continue Lasix 20 mg daily Continue Carvedilol 6.25 mg BID Continue Amlodipine 10 mg daily Continue Enalapril 20 mg BID Follow-up 4-6 months

## 2024-03-21 NOTE — REVIEW OF SYSTEMS
[Negative] : Heme/Lymph [FreeTextEntry6] : See HPI [FreeTextEntry5] : See HPI [FreeTextEntry8] : See HPI [FreeTextEntry9] : +arthritis

## 2024-03-21 NOTE — PHYSICAL EXAM
[Well Developed] : well developed [No Acute Distress] : no acute distress [Normal Conjunctiva] : normal conjunctiva [No Carotid Bruit] : no carotid bruit [Normal S1, S2] : normal S1, S2 [No Murmur] : no murmur [No Rub] : no rub [Clear Lung Fields] : clear lung fields [No Respiratory Distress] : no respiratory distress  [Good Air Entry] : good air entry [Soft] : abdomen soft [Non Tender] : non-tender [Normal Gait] : normal gait [No Edema] : no edema [No Cyanosis] : no cyanosis [No Clubbing] : no clubbing [No Rash] : no rash [No Skin Lesions] : no skin lesions [Moves all extremities] : moves all extremities [No Focal Deficits] : no focal deficits [Alert and Oriented] : alert and oriented

## 2024-05-29 ENCOUNTER — APPOINTMENT (OUTPATIENT)
Dept: UROLOGY | Facility: CLINIC | Age: 79
End: 2024-05-29
Payer: COMMERCIAL

## 2024-05-29 VITALS
BODY MASS INDEX: 25.76 KG/M2 | DIASTOLIC BLOOD PRESSURE: 72 MMHG | HEART RATE: 72 BPM | HEIGHT: 68 IN | OXYGEN SATURATION: 97 % | WEIGHT: 170 LBS | SYSTOLIC BLOOD PRESSURE: 134 MMHG

## 2024-05-29 DIAGNOSIS — N13.8 BENIGN PROSTATIC HYPERPLASIA WITH LOWER URINARY TRACT SYMPMS: ICD-10-CM

## 2024-05-29 DIAGNOSIS — N52.01 ERECTILE DYSFUNCTION DUE TO ARTERIAL INSUFFICIENCY: ICD-10-CM

## 2024-05-29 DIAGNOSIS — N40.1 BENIGN PROSTATIC HYPERPLASIA WITH LOWER URINARY TRACT SYMPMS: ICD-10-CM

## 2024-05-29 PROCEDURE — G2211 COMPLEX E/M VISIT ADD ON: CPT | Mod: NC

## 2024-05-29 PROCEDURE — 99214 OFFICE O/P EST MOD 30 MIN: CPT | Mod: 25

## 2024-05-29 PROCEDURE — 51736 URINE FLOW MEASUREMENT: CPT

## 2024-05-29 PROCEDURE — 51798 US URINE CAPACITY MEASURE: CPT

## 2024-05-29 RX ORDER — TAMSULOSIN HYDROCHLORIDE 0.4 MG/1
0.4 CAPSULE ORAL
Qty: 90 | Refills: 3 | Status: ACTIVE | COMMUNITY
Start: 2024-05-29 | End: 1900-01-01

## 2024-05-29 NOTE — HISTORY OF PRESENT ILLNESS
[FreeTextEntry1] : 78M who previously followed with Dr. Kraft.  urolift procedure in OR on June 27th -- overall has improved -- Qmax is good at 20 and pvr is 21 proximal implant attempts were pull throughs/bone strikes x3 i moved to the prostatic urethra at the veru where I was able to stack two implants on each side for a proper channel. 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  Aug 2023 uroflow -- pvr 21ml, Q max = 20.8ml.s  Office visit 5/29/24 Patient presents today with no new acute complaints.  He reports that he has been off Flomax and finasteride and has been voiding well.  He has no significant irritative voiding symptoms or nocturia.  No gross hematuria.  He does report that he sometimes has to Valsalva to initiate his stream however he has an adequate stream which has been improved since his UroLift procedure.  PVR 28mL Uroflow: Peak flow rate of 20 mL/s, voided volume 187 mL

## 2024-05-29 NOTE — ASSESSMENT
[FreeTextEntry1] : #ED - Cialis 20mg PRN, pt reports not taking it at this time.   #PSA Screening - PSA 1.81 4(/2024) - Repeat 4/2025  Decision to screen older patients: The decision to screen patients should be an SDM conversation predicated upon a persons prior PSA levels and general health, and a flexible age to discontinue screening may be based on individualized decision making to balance detection of aggressive cancers and overdiagnosis. This is particularly important in people between the ages of 70 to 80 years where there is a higher risk of competing mortality.  #BPH/LUTS - start Flomax 0.4mg qhs - start finasteride 5mg daily - PVR on bladder scan 28mL - Uroflow 20.0ml/s, VV 187mL -Patient return to office in 6 months to reassess uroflow and PVR and symptoms.  All questions and concerns addressed patient satisfied with his care. Discussed behavioral modifications: 1) Double voiding 2) Avoid caffeine, alcohol, spicy foods, tea, soda, artificial sweeteners, drinking liquids 3-4 hrs before bedtime 3) Perform leg elevation prior to sleeping 4) Avoid constipation by increasing dietary fiber intake with daily supplements to achieve 25 grams of fiber per day.  Increase daily hydration to 64oz of non-caffeinated liquids.  Use stool softeners and/or laxatives as needed if still unable to have daily soft BM without straining.   Also discussed medications adverse effects: 1) Alpha blocker - SE's include light headedness, orthostasis, reduction in volume of ejaculation, floppy iris syndrome 2) 5ARI - SE's include gynecomastia, erectile dysfunction, hypothetical risk of high grade CaP   If medical management fails, pt is in chronic urinary retention, recurrent UTIs or urolithiasis, or the patient has EMILY caused by obstructive voiding symptoms, he may consider surgery. There are different surgical options including transurethral resection of the prostate, Greenlight laser prostatectomy, Minimally invasive surgical therapies like Urolift and Rezum, Holep and Robotic simple prostatectomy. We discussed the risks including bleeding, infection, damage to the urethra, bladder and ureteral orifices, scar tissue, leaking (either due to injuring the sphincter or lowering bladder outlet obstruction with concomitant detrusor overactivity) and retrograde ejaculation. The patient understands that some of these complications are reversible while some are not and may require additional procedures.

## 2024-05-30 RX ORDER — TADALAFIL 20 MG/1
20 TABLET, FILM COATED ORAL
Qty: 12 | Refills: 1 | Status: ACTIVE | COMMUNITY
Start: 2024-05-30 | End: 1900-01-01

## 2024-05-30 RX ORDER — FINASTERIDE 5 MG/1
5 TABLET, FILM COATED ORAL DAILY
Qty: 90 | Refills: 3 | Status: ACTIVE | COMMUNITY
Start: 1900-01-01 | End: 1900-01-01

## 2024-08-14 ENCOUNTER — APPOINTMENT (OUTPATIENT)
Dept: CARDIOLOGY | Facility: CLINIC | Age: 79
End: 2024-08-14
Payer: COMMERCIAL

## 2024-08-14 ENCOUNTER — NON-APPOINTMENT (OUTPATIENT)
Age: 79
End: 2024-08-14

## 2024-08-14 ENCOUNTER — RESULT CHARGE (OUTPATIENT)
Age: 79
End: 2024-08-14

## 2024-08-14 VITALS — DIASTOLIC BLOOD PRESSURE: 68 MMHG | SYSTOLIC BLOOD PRESSURE: 116 MMHG

## 2024-08-14 DIAGNOSIS — I25.10 ATHEROSCLEROTIC HEART DISEASE OF NATIVE CORONARY ARTERY W/OUT ANGINA PECTORIS: ICD-10-CM

## 2024-08-14 DIAGNOSIS — I10 ESSENTIAL (PRIMARY) HYPERTENSION: ICD-10-CM

## 2024-08-14 PROCEDURE — 99214 OFFICE O/P EST MOD 30 MIN: CPT | Mod: 25

## 2024-08-14 PROCEDURE — G2211 COMPLEX E/M VISIT ADD ON: CPT | Mod: NC

## 2024-08-14 PROCEDURE — 93000 ELECTROCARDIOGRAM COMPLETE: CPT

## 2024-08-14 NOTE — HISTORY OF PRESENT ILLNESS
[FreeTextEntry1] : Previously seen at [facility].  History of CAD s/p 1v CABG, (4/1982), MI in 8/1993 (no intervention), HTN, s/p Urolift implant procedure.  No CP, SOB, palpitations, orthopnea/PND, dizziness or syncope.   ECHO (11/2023):  EF 50-55%, Mod MR, Mild AI ECHO (6/2022):  EF 45-50%, basal-mid inferolateral hypokinesis, Mild AI, Mild-mod MR Lexiscan MPI (5/2022):  Mid-distal inferior infarction (scar) consistent with known prior MI, no ischemia, LVEF 45-50%

## 2024-08-14 NOTE — DISCUSSION/SUMMARY
[FreeTextEntry1] : CAD s/p 1v CABG, (4/1982), MI 8/1993 (no intervention) - Stable - Continue aspirin and Atorvastatin - Continue Lasix 20 mg daily  HTN - Controlled - Continue Carvedilol 6.25 mg BID - Continue Amlodipine 10 mg daily - wants to try to wean off - Continue Enalapril 20 mg BID  Follow-up 4-6 months

## 2024-10-22 NOTE — ASU PREOP CHECKLIST - WAS PATIENT ON BETA BLOCKER?
RT Note  Hemanth ANSELMO Kiki 39 y.o. male MRN: [id number]  Unit/Bed#: ICU 05 Encounter: [id number]        Resp Comments: (P) RN notified, RT that pt has had to be bagged for breath holding /clamping down 5 times already this morning. each time no longer then one minute.     Yes

## 2024-12-19 ENCOUNTER — APPOINTMENT (OUTPATIENT)
Dept: UROLOGY | Facility: CLINIC | Age: 79
End: 2024-12-19
Payer: COMMERCIAL

## 2024-12-19 DIAGNOSIS — R35.0 FREQUENCY OF MICTURITION: ICD-10-CM

## 2024-12-19 DIAGNOSIS — Z13.6 ENCOUNTER FOR SCREENING FOR CARDIOVASCULAR DISORDERS: ICD-10-CM

## 2024-12-19 DIAGNOSIS — N40.1 BENIGN PROSTATIC HYPERPLASIA WITH LOWER URINARY TRACT SYMPMS: ICD-10-CM

## 2024-12-19 DIAGNOSIS — N13.8 BENIGN PROSTATIC HYPERPLASIA WITH LOWER URINARY TRACT SYMPMS: ICD-10-CM

## 2024-12-19 PROCEDURE — 99214 OFFICE O/P EST MOD 30 MIN: CPT | Mod: 25

## 2024-12-19 PROCEDURE — 99204 OFFICE O/P NEW MOD 45 MIN: CPT

## 2024-12-19 PROCEDURE — G2211 COMPLEX E/M VISIT ADD ON: CPT

## 2024-12-19 PROCEDURE — 51736 URINE FLOW MEASUREMENT: CPT

## 2024-12-19 PROCEDURE — 51798 US URINE CAPACITY MEASURE: CPT

## 2024-12-19 RX ORDER — VIBEGRON 75 MG/1
75 TABLET, FILM COATED ORAL
Qty: 90 | Refills: 1 | Status: ACTIVE | COMMUNITY
Start: 2024-12-19 | End: 1900-01-01

## 2025-01-16 ENCOUNTER — APPOINTMENT (OUTPATIENT)
Dept: CARDIOLOGY | Facility: CLINIC | Age: 80
End: 2025-01-16
Payer: COMMERCIAL

## 2025-01-16 VITALS
HEART RATE: 58 BPM | HEIGHT: 68 IN | SYSTOLIC BLOOD PRESSURE: 150 MMHG | BODY MASS INDEX: 25.76 KG/M2 | WEIGHT: 170 LBS | DIASTOLIC BLOOD PRESSURE: 80 MMHG

## 2025-01-16 DIAGNOSIS — I10 ESSENTIAL (PRIMARY) HYPERTENSION: ICD-10-CM

## 2025-01-16 DIAGNOSIS — E78.5 HYPERLIPIDEMIA, UNSPECIFIED: ICD-10-CM

## 2025-01-16 DIAGNOSIS — I25.10 ATHEROSCLEROTIC HEART DISEASE OF NATIVE CORONARY ARTERY W/OUT ANGINA PECTORIS: ICD-10-CM

## 2025-01-16 PROCEDURE — 99214 OFFICE O/P EST MOD 30 MIN: CPT

## 2025-01-16 PROCEDURE — 93000 ELECTROCARDIOGRAM COMPLETE: CPT

## 2025-01-16 PROCEDURE — G2211 COMPLEX E/M VISIT ADD ON: CPT | Mod: NC

## 2025-02-12 ENCOUNTER — RX RENEWAL (OUTPATIENT)
Age: 80
End: 2025-02-12

## 2025-05-27 ENCOUNTER — RX RENEWAL (OUTPATIENT)
Age: 80
End: 2025-05-27

## 2025-06-19 ENCOUNTER — APPOINTMENT (OUTPATIENT)
Dept: UROLOGY | Facility: CLINIC | Age: 80
End: 2025-06-19
Payer: COMMERCIAL

## 2025-06-19 VITALS
RESPIRATION RATE: 17 BRPM | OXYGEN SATURATION: 96 % | WEIGHT: 170 LBS | SYSTOLIC BLOOD PRESSURE: 119 MMHG | TEMPERATURE: 98 F | HEIGHT: 68 IN | HEART RATE: 77 BPM | BODY MASS INDEX: 25.76 KG/M2 | DIASTOLIC BLOOD PRESSURE: 63 MMHG

## 2025-06-19 PROCEDURE — G2211 COMPLEX E/M VISIT ADD ON: CPT | Mod: NC

## 2025-06-19 PROCEDURE — 99214 OFFICE O/P EST MOD 30 MIN: CPT

## 2025-07-14 NOTE — ASU DISCHARGE PLAN (ADULT/PEDIATRIC) - NS MD DC FALL RISK RISK
For information on Fall & Injury Prevention, visit: [email or web address] OR  [email or web address] OR  https://www.cdc.gov/steadi/patient.html
None

## 2025-07-17 ENCOUNTER — APPOINTMENT (OUTPATIENT)
Dept: CARDIOLOGY | Facility: CLINIC | Age: 80
End: 2025-07-17
Payer: COMMERCIAL

## 2025-07-17 VITALS
WEIGHT: 170 LBS | HEIGHT: 68 IN | SYSTOLIC BLOOD PRESSURE: 122 MMHG | HEART RATE: 74 BPM | BODY MASS INDEX: 25.76 KG/M2 | DIASTOLIC BLOOD PRESSURE: 60 MMHG

## 2025-07-17 PROCEDURE — G2211 COMPLEX E/M VISIT ADD ON: CPT | Mod: NC

## 2025-07-17 PROCEDURE — 93000 ELECTROCARDIOGRAM COMPLETE: CPT

## 2025-07-17 PROCEDURE — 99214 OFFICE O/P EST MOD 30 MIN: CPT

## 2025-07-17 RX ORDER — ASPIRIN 81 MG/1
81 TABLET, DELAYED RELEASE ORAL DAILY
Qty: 90 | Refills: 3 | Status: ACTIVE | COMMUNITY
Start: 2025-07-17